# Patient Record
Sex: MALE | Race: OTHER
[De-identification: names, ages, dates, MRNs, and addresses within clinical notes are randomized per-mention and may not be internally consistent; named-entity substitution may affect disease eponyms.]

---

## 2018-03-21 ENCOUNTER — HOSPITAL ENCOUNTER (INPATIENT)
Dept: HOSPITAL 74 - YASAS | Age: 43
LOS: 3 days | Discharge: LEFT BEFORE DISCHARGE | DRG: 770 | End: 2018-03-24
Attending: INTERNAL MEDICINE | Admitting: INTERNAL MEDICINE
Payer: COMMERCIAL

## 2018-03-21 VITALS — BODY MASS INDEX: 31.6 KG/M2

## 2018-03-21 DIAGNOSIS — F19.24: ICD-10-CM

## 2018-03-21 DIAGNOSIS — Z87.09: ICD-10-CM

## 2018-03-21 DIAGNOSIS — F51.05: ICD-10-CM

## 2018-03-21 DIAGNOSIS — F32.9: ICD-10-CM

## 2018-03-21 DIAGNOSIS — G40.509: ICD-10-CM

## 2018-03-21 DIAGNOSIS — F10.230: Primary | ICD-10-CM

## 2018-03-21 DIAGNOSIS — F41.1: ICD-10-CM

## 2018-03-21 DIAGNOSIS — I10: ICD-10-CM

## 2018-03-21 DIAGNOSIS — F12.10: ICD-10-CM

## 2018-03-21 LAB
APPEARANCE UR: CLEAR
BILIRUB UR STRIP.AUTO-MCNC: NEGATIVE MG/DL
COLOR UR: (no result)
KETONES UR QL STRIP: (no result)
LEUKOCYTE ESTERASE UR QL STRIP.AUTO: NEGATIVE
NITRITE UR QL STRIP: NEGATIVE
PH UR: 6 [PH] (ref 5–8)
PROT UR QL STRIP: NEGATIVE
PROT UR QL STRIP: NEGATIVE
RBC # UR STRIP: NEGATIVE /UL
SP GR UR: 1 (ref 1–1.03)
UROBILINOGEN UR STRIP-MCNC: NEGATIVE MG/DL (ref 0.2–1)

## 2018-03-21 PROCEDURE — HZ2ZZZZ DETOXIFICATION SERVICES FOR SUBSTANCE ABUSE TREATMENT: ICD-10-PCS | Performed by: INTERNAL MEDICINE

## 2018-03-21 RX ADMIN — LEVETIRACETAM SCH MG: 500 TABLET, FILM COATED ORAL at 22:37

## 2018-03-21 RX ADMIN — Medication SCH MG: at 22:37
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
## 2018-03-22 LAB
ALBUMIN SERPL-MCNC: 3 G/DL (ref 3.4–5)
ALP SERPL-CCNC: 72 U/L (ref 45–117)
ALT SERPL-CCNC: 45 U/L (ref 12–78)
ANION GAP SERPL CALC-SCNC: 10 MMOL/L (ref 8–16)
AST SERPL-CCNC: 46 U/L (ref 15–37)
BILIRUB SERPL-MCNC: 2 MG/DL (ref 0.2–1)
BUN SERPL-MCNC: 7 MG/DL (ref 7–18)
CALCIUM SERPL-MCNC: 8.4 MG/DL (ref 8.5–10.1)
CHLORIDE SERPL-SCNC: 104 MMOL/L (ref 98–107)
CO2 SERPL-SCNC: 29 MMOL/L (ref 21–32)
CREAT SERPL-MCNC: 0.8 MG/DL (ref 0.7–1.3)
DEPRECATED RDW RBC AUTO: 14.9 % (ref 11.9–15.9)
GLUCOSE SERPL-MCNC: 85 MG/DL (ref 74–106)
HCT VFR BLD CALC: 36.9 % (ref 35.4–49)
HGB BLD-MCNC: 12.2 GM/DL (ref 11.7–16.9)
MCH RBC QN AUTO: 30.7 PG (ref 25.7–33.7)
MCHC RBC AUTO-ENTMCNC: 33.1 G/DL (ref 32–35.9)
MCV RBC: 92.7 FL (ref 80–96)
PLATELET # BLD AUTO: 198 K/MM3 (ref 134–434)
PMV BLD: 7.8 FL (ref 7.5–11.1)
POTASSIUM SERPLBLD-SCNC: 3.7 MMOL/L (ref 3.5–5.1)
PROT SERPL-MCNC: 6.4 G/DL (ref 6.4–8.2)
RBC # BLD AUTO: 3.98 M/MM3 (ref 4–5.6)
SODIUM SERPL-SCNC: 143 MMOL/L (ref 136–145)
WBC # BLD AUTO: 3.7 K/MM3 (ref 4–10)

## 2018-03-22 RX ADMIN — Medication SCH TAB: at 10:50

## 2018-03-22 RX ADMIN — Medication SCH MG: at 22:18

## 2018-03-22 RX ADMIN — LEVETIRACETAM SCH MG: 500 TABLET, FILM COATED ORAL at 10:50

## 2018-03-22 RX ADMIN — LEVETIRACETAM SCH MG: 500 TABLET, FILM COATED ORAL at 22:18

## 2018-03-22 RX ADMIN — Medication SCH MG: at 22:19

## 2018-03-22 RX ADMIN — METOPROLOL TARTRATE SCH MG: 25 TABLET, FILM COATED ORAL at 10:50

## 2018-03-22 NOTE — PN
S CIWA





- CIWA Score


Nausea/Vomitin-Mild Nausea/No Vomiting


Muscle Tremors: 4-Moderate,w/Arms Extend


Anxiety: 4-Mod. Anxious/Guarded


Agitation: 4-Moderately Restless


Paroxysmal Sweats: 2


Orientation: 1-Uncertain about Date


Tacttile Disturbances: 1-Very Mild Itch/Numbness


Auditory Disturbances: 0-None


Visual Disturbances: 0-None


Headache: 1-Very Mild


CIWA-Ar Total Score: 18





[facility] Progress Note (SOAP)


Subjective: 





sweat tremor mild headache anxiety restlessness unable to remember detail about 

yesterday


oriented to staff and time and space


patient is cooperative but anxious


Objective: 





18 09:19


 Vital Signs











Temperature  96.3 F L  18 06:33


 


Pulse Rate  86   18 06:33


 


Respiratory Rate  20   18 06:33


 


Blood Pressure  114/75   18 06:33


 


O2 Sat by Pulse Oximetry (%)      








 Laboratory Last Values











Urine Color  Straw   18  20:00    


 


Urine Appearance  Clear   18  20:00    


 


Urine pH  6.0  (5.0-8.0)   18  20:00    


 


Ur Specific Gravity  1.004  (1.001-1.035)   18  20:00    


 


Urine Protein  Negative  (NEGATIVE)   18  20:00    


 


Urine Glucose (UA)  Negative  (NEGATIVE)   18  20:00    


 


Urine Ketones  Trace  (NEGATIVE)  H  18  20:00    


 


Urine Blood  Negative  (NEGATIVE)   18  20:00    


 


Urine Nitrite  Negative  (NEGATIVE)   18  20:00    


 


Urine Bilirubin  Negative  (NEGATIVE)   18  20:00    


 


Urine Urobilinogen  Negative mg/dL (0.2-1.0)   18  20:00    


 


Ur Leukocyte Esterase  Negative  (NEGATIVE)   18  20:00    








lab noted


Assessment: 





18 09:20


withdrawal sx


Plan: 





continue detox

## 2018-03-22 NOTE — EKG
Test Reason : 

Blood Pressure : ***/*** mmHG

Vent. Rate : 081 BPM     Atrial Rate : 081 BPM

   P-R Int : 134 ms          QRS Dur : 100 ms

    QT Int : 418 ms       P-R-T Axes : 021 049 043 degrees

   QTc Int : 485 ms

 

NORMAL SINUS RHYTHM

PROLONGED QT

ABNORMAL ECG

WHEN COMPARED WITH ECG OF 21-MAR-2018 19:27,

T WAVE INVERSION NOW EVIDENT IN ANTERIOR LEADS

Confirmed by TOI SAMUELS MD ([id number]) on 3/22/2018 2:35:29 PM

 

Referred By:             Confirmed By:TOI SAMUELS MD

## 2018-03-22 NOTE — CONSULT
[facility] Psychiatric Consult





- Data


Date of interview: 03/22/18


Admission source: [facility]


Identifying data: This is 42 years old male , , living in shelter, [place] 

[place], with history of Alcohol and Nicotine dependence, is here for 

detoxification. Patientreports no history of psychiatric hospitalizations.


Substance Abuse History: Smoking history: Current every day smoker.  Have you 

smoked in the past 12 months: Yes.  Aproximately how many cigarettes per day: 

10.  Hx Chewing Tobacco Use: No.  Initiated information on smoking cessation: 

Yes.  'Breaking Loose' booklet given: 03/21/18.  - Substance & Tx. History.  Hx 

Alcohol Use: Yes.  Hx Substance Use: No.  Substance Use Type: Alcohol.  Hx 

Substance Use Treatment: Yes ([facility] 03/25/15 TO 03/29/15).  - Substances Abused.

  ** Alcohol.  Route: Oral.  Frequency: Daily.  Amount used: fifth of whiskey.  

Age of first use: 14.  Date of Last Use: 03/21/18


Medical History: HTN, Seizure history, History of Tracheostomy 2012.


Psychiatric History: Patient reports history of anxiety, deoression, insomnia, 

reoports taking prior to admission:  Ambien 10mg po qhs.  Denies suicidal 

history


Physical/Sexual Abuse/Trauma History: Denies


Additional Comment: Ambien 10mg po qhs





Mental Status Exam





- Mental Status Exam


Alert and Oriented to: Person


Cognitive Function: Fair


Patient Appearance: Well Groomed


Mood: Anxious


Affect: Mood Congruent


Patient Behavior: Cooperative


Speech Pattern: Appropriate


Voice Loudness: Normal


Thought Process: Goal Oriented


Thought Disorder: Being Controlled


Hallucinations: Denies


Suicidal Ideation: Denies


Homicidal Ideation: Denies


Insight/Judgement: Fair


Sleep: Difficulty falling asleep


Appetite: Fair


Muscle strength/Tone: Mild Hypotonicity


Gait/Station: Shuffling


Additional Comments: Ambien 10mg po qhs





Psychiatric Findings





- Problem List (Axis 1, 2,3)


(1) Alcohol dependence with uncomplicated intoxication


Current Visit: Yes   Status: Acute   





(2) Alcohol dependence with uncomplicated withdrawal


Current Visit: Yes   Status: Acute   





(3) Alcohol dependence


Current Visit: No   Status: Acute   





(4) JOVITA (generalized anxiety disorder)


Current Visit: No   Status: Acute   





(5) MDD (major depressive disorder)


Current Visit: No   Status: Suspected   





(6) Nicotine dependence


Current Visit: No   Status: Acute   





(7) Opioid dependence


Current Visit: No   Status: Acute   





(8) Seizures


Current Visit: No   Status: Acute   





(9) Drug-induced mood disorder


Current Visit: No   Status: Suspected   





- Initial Treatment Plan


Initial Treatment Plan: Ambien 10mg po qhs

## 2018-03-22 NOTE — PN
[facility] CIWA





- CIWA Score


Nausea/Vomitin-Mild Nausea/No Vomiting


Muscle Tremors: 4-Moderate,w/Arms Extend


Anxiety: 4-Mod. Anxious/Guarded


Agitation: 4-Moderately Restless


Paroxysmal Sweats: 1-Minimal Palms Moist


Orientation: 0-Oriented


Tacttile Disturbances: 0-None


Auditory Disturbances: 0-None


Visual Disturbances: 0-None


Headache: 0-None Present


CIWA-Ar Total Score: 14





[facility] Progress Note (SOAP)


Subjective: 





sweat 


tremor


anxiety


restlessness


mild gi distress


trouble sleeping


Objective: 





18 11:14


 Vital Signs











Temperature  97.7 F   18 10:00


 


Pulse Rate  96 H  18 10:00


 


Respiratory Rate  20   18 10:00


 


Blood Pressure  132/87   18 10:00


 


O2 Sat by Pulse Oximetry (%)      








 Laboratory Last Values











WBC  3.7 K/mm3 (4.0-10.0)  L D 18  07:30    


 


RBC  3.98 M/mm3 (4.00-5.60)  L  18  07:30    


 


Hgb  12.2 GM/dL (11.7-16.9)  D 18  07:30    


 


Hct  36.9 % (35.4-49)   18  07:30    


 


MCV  92.7 fl (80-96)   18  07:30    


 


MCH  30.7 pg (25.7-33.7)   18  07:30    


 


MCHC  33.1 g/dl (32.0-35.9)   18  07:30    


 


RDW  14.9 % (11.9-15.9)   18  07:30    


 


Plt Count  198 K/MM3 (134-434)  D 18  07:30    


 


MPV  7.8 fl (7.5-11.1)   18  07:30    


 


Sodium  143 mmol/L (136-145)   18  07:30    


 


Potassium  3.7 mmol/L (3.5-5.1)   18  07:30    


 


Chloride  104 mmol/L ()   18  07:30    


 


Carbon Dioxide  29 mmol/L (21-32)  D 18  07:30    


 


Anion Gap  10  (8-16)   18  07:30    


 


BUN  7 mg/dL (7-18)   18  07:30    


 


Creatinine  0.8 mg/dL (0.7-1.3)   18  07:30    


 


Creat Clearance w eGFR  > 60  (>60)   18  07:30    


 


Random Glucose  85 mg/dL ()   18  07:30    


 


Calcium  8.4 mg/dL (8.5-10.1)  L  18  07:30    


 


Total Bilirubin  2.0 mg/dL (0.2-1.0)  H D 18  07:30    


 


AST  46 U/L (15-37)  H D 18  07:30    


 


ALT  45 U/L (12-78)  D 18  07:30    


 


Alkaline Phosphatase  72 U/L ()   18  07:30    


 


Total Protein  6.4 g/dl (6.4-8.2)   18  07:30    


 


Albumin  3.0 g/dl (3.4-5.0)  L  18  07:30    


 


Urine Color  Straw   18  20:00    


 


Urine Appearance  Clear   18  20:00    


 


Urine pH  6.0  (5.0-8.0)   18  20:00    


 


Ur Specific Gravity  1.004  (1.001-1.035)   18  20:00    


 


Urine Protein  Negative  (NEGATIVE)   18  20:00    


 


Urine Glucose (UA)  Negative  (NEGATIVE)   18  20:00    


 


Urine Ketones  Trace  (NEGATIVE)  H  18  20:00    


 


Urine Blood  Negative  (NEGATIVE)   18  20:00    


 


Urine Nitrite  Negative  (NEGATIVE)   18  20:00    


 


Urine Bilirubin  Negative  (NEGATIVE)   18  20:00    


 


Urine Urobilinogen  Negative mg/dL (0.2-1.0)   18  20:00    


 


Ur Leukocyte Esterase  Negative  (NEGATIVE)   18  20:00    








lab noted


Assessment: 





18 11:15


withdrawal sx


Plan: 





continue detox

## 2018-03-23 RX ADMIN — METOPROLOL TARTRATE SCH MG: 25 TABLET, FILM COATED ORAL at 10:06

## 2018-03-23 RX ADMIN — Medication SCH APPLIC: at 22:06

## 2018-03-23 RX ADMIN — LEVETIRACETAM SCH MG: 500 TABLET, FILM COATED ORAL at 22:06

## 2018-03-23 RX ADMIN — Medication SCH TAB: at 10:06

## 2018-03-23 RX ADMIN — Medication SCH MG: at 22:05

## 2018-03-23 RX ADMIN — Medication SCH APPLIC: at 10:54

## 2018-03-23 RX ADMIN — Medication SCH MG: at 22:06

## 2018-03-23 RX ADMIN — LEVETIRACETAM SCH MG: 500 TABLET, FILM COATED ORAL at 10:06

## 2018-03-23 NOTE — PN
[facility] Progress Note (SOAP)


Subjective: 





interrupted sleep, sweats,shakes,need a/d oint for left facial scrap after sz. 

, not sleep. 


Objective: 





03/23/18 10:49


 Vital Signs











Temperature  96 F L  03/23/18 09:39


 


Pulse Rate  84   03/23/18 09:39


 


Respiratory Rate  18   03/23/18 09:39


 


Blood Pressure  133/90   03/23/18 09:39


 


O2 Sat by Pulse Oximetry (%)      








 Laboratory Tests











  03/21/18 03/22/18 03/22/18





  20:00 07:30 07:30


 


WBC   3.7 L D 


 


RBC   3.98 L 


 


Hgb   12.2  D 


 


Hct   36.9 


 


MCV   92.7 


 


MCH   30.7 


 


MCHC   33.1 


 


RDW   14.9 


 


Plt Count   198  D 


 


MPV   7.8 


 


Sodium    143


 


Potassium    3.7


 


Chloride    104


 


Carbon Dioxide    29  D


 


Anion Gap    10


 


BUN    7


 


Creatinine    0.8


 


Creat Clearance w eGFR    > 60


 


Random Glucose    85


 


Calcium    8.4 L


 


Total Bilirubin    2.0 H D


 


AST    46 H D


 


ALT    45  D


 


Alkaline Phosphatase    72


 


Total Protein    6.4


 


Albumin    3.0 L


 


Urine Color  Straw  


 


Urine Appearance  Clear  


 


Urine pH  6.0  


 


Ur Specific Gravity  1.004  


 


Urine Protein  Negative  


 


Urine Glucose (UA)  Negative  


 


Urine Ketones  Trace H  


 


Urine Blood  Negative  


 


Urine Nitrite  Negative  


 


Urine Bilirubin  Negative  


 


Urine Urobilinogen  Negative  


 


Ur Leukocyte Esterase  Negative  


 


RPR Titer   














  03/22/18





  07:30


 


WBC 


 


RBC 


 


Hgb 


 


Hct 


 


MCV 


 


MCH 


 


MCHC 


 


RDW 


 


Plt Count 


 


MPV 


 


Sodium 


 


Potassium 


 


Chloride 


 


Carbon Dioxide 


 


Anion Gap 


 


BUN 


 


Creatinine 


 


Creat Clearance w eGFR 


 


Random Glucose 


 


Calcium 


 


Total Bilirubin 


 


AST 


 


ALT 


 


Alkaline Phosphatase 


 


Total Protein 


 


Albumin 


 


Urine Color 


 


Urine Appearance 


 


Urine pH 


 


Ur Specific Gravity 


 


Urine Protein 


 


Urine Glucose (UA) 


 


Urine Ketones 


 


Urine Blood 


 


Urine Nitrite 


 


Urine Bilirubin 


 


Urine Urobilinogen 


 


Ur Leukocyte Esterase 


 


RPR Titer  Nonreactive








pt aox3 in nad ambulating  


left face mild excoriation, 


Assessment: 





03/23/18 10:50


withdrawal sx's 


left facial excoriation 


insomnia 











cont. detox 


increase fluids 


a/d oint bid 


pysch eval for sleep meds

## 2018-03-23 NOTE — PN
Psychiatric Progress Note


Vital Signs: 


 Vital Signs











 Period  Temp  Pulse  Resp  BP Sys/Ayala  Pulse Ox


 


 Last 24 Hr  96 F-98.2 F  80-90  18-18  123-145/77-90  











Date of Session: 03/23/18


Chief Complaint:: "I can't sleep."


HPI: Pt. admitted to  for alcohol and nicotine dependence.


ROS: Unremarkable


Current Medications: 


Active Medications











Generic Name Dose Route Start Last Admin





  Trade Name Freq  PRN Reason Stop Dose Admin


 


Acetaminophen  650 mg  03/21/18 18:02  





  Tylenol -  PO   





  Q4H PRN   





  FEVER   


 


Al Hydroxide/Mg Hydroxide  30 ml  03/21/18 18:02  





  Mylanta Oral Suspension -  PO   





  Q6H PRN   





  DYSPEPSIA   


 


Chlordiazepoxide HCl  25 mg  03/22/18 23:00  03/23/18 10:06





  Librium -  PO  03/23/18 17:01  25 mg





  [id number] RISHABH   Administration


 


Chlordiazepoxide HCl  15 mg  03/23/18 23:00  





  Librium -  PO  03/24/18 17:01  





  [id number] RISHABH   


 


Chlordiazepoxide HCl  25 mg  03/21/18 18:02  





  Librium -  PO  03/24/18 18:01  





  Q4H PRN   





  WITHDRAWAL(CONT SUBST)   


 


Chlordiazepoxide HCl  10 mg  03/24/18 23:00  





  Librium -  PO  03/25/18 17:01  





  [id number] RISHABH   


 


Clonidine  0.2 mg  03/21/18 22:00  03/23/18 10:06





  Catapres -  PO   0.2 mg





  BID RISHABH   Administration


 


Eucalyptus/Menthol/Phenol/Sorbitol  1 each  03/21/18 18:02  





  Cepastat Lozenge -  MM   





  Q4H PRN   





  SORE THROAT   


 


Guaifenesin  10 ml  03/21/18 18:02  





  Robitussin Dm -  PO   





  Q6H PRN   





  COUGH   


 


Hydroxyzine Pamoate  50 mg  03/21/18 18:02  





  Vistaril -  PO   





  Q4H PRN   





  AGITATION   


 


Ibuprofen  400 mg  03/21/18 18:02  





  Motrin -  PO   





  Q6H PRN   





  PAIN LEVEL 4-6   


 


Levetiracetam  500 mg  03/21/18 22:00  03/23/18 10:06





  Keppra -  PO   500 mg





  BID RISHABH   Administration


 


Loperamide HCl  4 mg  03/21/18 18:02  





  Imodium -  PO   





  Q6H PRN   





  DIARRHEA   


 


Magnesium Citrate  300 ml  03/21/18 18:02  





  Citroma -  PO   





  Q48H PRN   





  CONSTIPATION   


 


Magnesium Hydroxide  30 ml  03/21/18 18:02  





  Milk Of Magnesia -  PO   





  DAILY PRN   





  CONSTIPATION   


 


Melatonin  5 mg  03/21/18 22:00  03/22/18 22:18





  Melatonin  PO   5 mg





  HS RISHABH   Administration


 


Metoprolol Tartrate  25 mg  03/22/18 10:00  03/23/18 10:06





  Lopressor -  PO   25 mg





  DAILY RISHABH   Administration


 


Prenatal Multivit/Folic Acid/Iron  1 tab  03/22/18 10:00  03/23/18 10:06





  Prenatal Vitamins (Sjr) -  PO   1 tab





  DAILY RISHABH   Administration


 


Pseudoephedrine/Triprolidine  1 combo  03/21/18 18:02  





  Actifed -  PO   





  TID PRN   





  NASAL CONGESTION   


 


Thiamine HCl  100 mg  03/21/18 22:00  03/22/18 22:19





  Vitamin B1 -  PO   100 mg





  HS RISHBAH   Administration


 


Vitamin A/Vitamin D  1 applic  03/23/18 10:00  03/23/18 10:54





  Vitamin A & D Top Oint -  TP   1 applic





  BID RISHABH   Administration


 


Zolpidem Tartrate  10 mg  03/22/18 22:00  





  Ambien -  PO  03/25/18 21:59  





  HS PRN   





  INSOMNIA   











Medication(s) Change(s): No.


Current Side Effect: No


Lab tests ordered: No


Lab tests reviewed: Yes


Provider note:: Writer met with patient concerning psychiatric reconsultation. 

Pt. c/o insomnia. Patient made aware that ambien was ordered by Dr. Santana on 

3/22/18. Pt. did not receive ambien 10mg last night. Pt. encouraged to take the 

ambien tonight. Nursing staff informed. Pt. educated on the benefits and side 

effects of ambien. Pt. made aware of the risk of parasomnia when accepting 

ambien. Will continue to monitor.


Total face to face time:: 15





Mental Status Exam





- Mental Status Exam


Alert and Oriented to: Time, Place, Person


Cognitive Function: Good


Patient Appearance: Well Groomed


Mood: Euthymic


Affect: Mood Congruent


Patient Behavior: Cooperative


Speech Pattern: Appropriate


Voice Loudness: Normal


Thought Process: Goal Oriented


Thought Disorder: Not Present


Hallucinations: Denies


Suicidal Ideation: Denies


Homicidal Ideation: Denies


Insight/Judgement: Poor


Sleep: Poorly


Appetite: Fair


Muscle strength/Tone: Normal


Gait/Station: Normal





Psychiatric Treatment Plan





- Problem List


(1) Alcohol dependence with uncomplicated withdrawal


Current Visit: Yes   





(2) Alcohol dependence


Current Visit: Yes   





(3) JOVITA (generalized anxiety disorder)


Current Visit: No   





(4) Insomnia


Current Visit: Yes   





(5) Drug-induced mood disorder


Current Visit: No

## 2018-03-24 VITALS — SYSTOLIC BLOOD PRESSURE: 120 MMHG | HEART RATE: 98 BPM | DIASTOLIC BLOOD PRESSURE: 84 MMHG | TEMPERATURE: 97.6 F

## 2018-03-24 RX ADMIN — LEVETIRACETAM SCH MG: 500 TABLET, FILM COATED ORAL at 09:11

## 2018-03-24 RX ADMIN — METOPROLOL TARTRATE SCH MG: 25 TABLET, FILM COATED ORAL at 09:11

## 2018-03-24 RX ADMIN — Medication SCH APPLIC: at 10:19

## 2018-03-24 RX ADMIN — Medication SCH TAB: at 10:18

## 2018-03-24 NOTE — PN
[facility] Progress Note (SOAP)


Subjective: 





sleep disturbance


shakes


Objective: 





03/24/18 16:54


A & O x 3,


noted to be ambulating steadily on unit


anxious


 Vital Signs











Temperature  97.6 F   03/24/18 09:51


 


Pulse Rate  98 H  03/24/18 09:51


 


Respiratory Rate  16   03/24/18 09:51


 


Blood Pressure  120/84   03/24/18 09:51


 


O2 Sat by Pulse Oximetry (%)      














Assessment: 





03/24/18 16:54


withdrawal sx








Plan: 





continue detox

## 2018-03-24 NOTE — DS
[facility] Detox Discharge Summary


Admission Date: 


03/21/18





Discharge Date: 03/24/18





- History


Additional Comments: 





After A.M rounds, pt requested to leave.


Gave no reasons, just that he wants to leave


Insists on leaving despite advice to stay,


in no acute distress, denies SI/HI


pt will leave AMA


Pertinent Past History: 





seizures


HTN


insomnia





- Physical Exam Results


Vital Signs: 


 Vital Signs











Temperature  97.6 F   03/24/18 09:51


 


Pulse Rate  98 H  03/24/18 09:51


 


Respiratory Rate  16   03/24/18 09:51


 


Blood Pressure  120/84   03/24/18 09:51


 


O2 Sat by Pulse Oximetry (%)      











Pertinent Admission Physical Exam Findings: 





withdrawal sx





- Medication


Discharge Medications: 


Ambulatory Orders





Clonidine HCl 0.2 mg PO BID 03/21/18 


Zolpidem Tartrate [Ambien] 10 mg PO HS 03/21/18 


levETIRAcetam [Keppra -] 500 mg PO BID 03/21/18 


Metoprolol Tartrate [Lopressor -] 25 mg PO DAILY #30 tablet 03/23/18 


Metoprolol Tartrate [Lopressor -] 25 mg PO DAILY #30 tablet 03/23/18 


Vitamin A & D Top Oint - 1 applic TP BID #1 tube 03/23/18 


levETIRAcetam [Keppra -] 500 mg PO BID #60 tablet 03/23/18 











- AMA


Did Patient Leave Against Medical Advice: Yes

## 2018-03-26 NOTE — EKG
Test Reason : 

Blood Pressure : ***/*** mmHG

Vent. Rate : 113 BPM     Atrial Rate : 113 BPM

   P-R Int : 134 ms          QRS Dur : 090 ms

    QT Int : 350 ms       P-R-T Axes : [phone] degrees

   QTc Int : 480 ms

 

SINUS TACHYCARDIA

MINIMAL VOLTAGE CRITERIA FOR LVH, MAY BE NORMAL VARIANT

NONSPECIFIC ST ABNORMALITY

ABNORMAL ECG

NO PREVIOUS ECGS AVAILABLE

Confirmed by HOLLY CASTILLO MD ([id number]) on 3/26/2018 9:17:23 AM

 

Referred By:             Confirmed By:HOLLY CASTILLO MD

## 2018-04-28 ENCOUNTER — HOSPITAL ENCOUNTER (INPATIENT)
Dept: HOSPITAL 74 - YASAS | Age: 43
LOS: 4 days | Discharge: OTHER INSTITUTION | End: 2018-05-02
Attending: INTERNAL MEDICINE | Admitting: INTERNAL MEDICINE
Payer: COMMERCIAL

## 2018-04-28 VITALS — BODY MASS INDEX: 30.7 KG/M2

## 2018-04-28 DIAGNOSIS — F10.230: Primary | ICD-10-CM

## 2018-04-28 DIAGNOSIS — G89.29: ICD-10-CM

## 2018-04-28 DIAGNOSIS — G40.909: ICD-10-CM
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
## 2018-04-28 DIAGNOSIS — F17.213: ICD-10-CM

## 2018-04-28 DIAGNOSIS — Z91.010: ICD-10-CM

## 2018-04-28 DIAGNOSIS — F41.1: ICD-10-CM

## 2018-04-28 DIAGNOSIS — F51.05: ICD-10-CM

## 2018-04-28 DIAGNOSIS — F16.20: ICD-10-CM

## 2018-04-28 DIAGNOSIS — F19.24: ICD-10-CM

## 2018-04-28 DIAGNOSIS — S01.81XD: ICD-10-CM

## 2018-04-28 DIAGNOSIS — Z91.018: ICD-10-CM

## 2018-04-28 DIAGNOSIS — Z87.11: ICD-10-CM

## 2018-04-28 DIAGNOSIS — I10: ICD-10-CM

## 2018-04-28 DIAGNOSIS — G47.00: ICD-10-CM

## 2018-04-28 DIAGNOSIS — F19.280: ICD-10-CM

## 2018-04-28 PROCEDURE — HZ2ZZZZ DETOXIFICATION SERVICES FOR SUBSTANCE ABUSE TREATMENT: ICD-10-PCS | Performed by: SURGERY

## 2018-04-28 RX ADMIN — Medication SCH MG: at 22:29

## 2018-04-28 RX ADMIN — LEVETIRACETAM SCH MG: 500 TABLET, FILM COATED ORAL at 22:29
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
## 2018-04-29 LAB
ALBUMIN SERPL-MCNC: 2.8 G/DL (ref 3.4–5)
ALP SERPL-CCNC: 61 U/L (ref 45–117)
ALT SERPL-CCNC: 45 U/L (ref 12–78)
ANION GAP SERPL CALC-SCNC: 5 MMOL/L (ref 8–16)
APPEARANCE UR: CLEAR
AST SERPL-CCNC: 52 U/L (ref 15–37)
BILIRUB SERPL-MCNC: 0.5 MG/DL (ref 0.2–1)
BILIRUB UR STRIP.AUTO-MCNC: NEGATIVE MG/DL
BUN SERPL-MCNC: 7 MG/DL (ref 7–18)
CALCIUM SERPL-MCNC: 8 MG/DL (ref 8.5–10.1)
CHLORIDE SERPL-SCNC: 106 MMOL/L (ref 98–107)
CO2 SERPL-SCNC: 29 MMOL/L (ref 21–32)
COLOR UR: (no result)
CREAT SERPL-MCNC: 0.8 MG/DL (ref 0.7–1.3)
DEPRECATED RDW RBC AUTO: 15.9 % (ref 11.9–15.9)
GLUCOSE SERPL-MCNC: 89 MG/DL (ref 74–106)
HCT VFR BLD CALC: 30.3 % (ref 35.4–49)
HGB BLD-MCNC: 10.3 GM/DL (ref 11.7–16.9)
KETONES UR QL STRIP: NEGATIVE
LEUKOCYTE ESTERASE UR QL STRIP.AUTO: NEGATIVE
MCH RBC QN AUTO: 31.5 PG (ref 25.7–33.7)
MCHC RBC AUTO-ENTMCNC: 34.1 G/DL (ref 32–35.9)
MCV RBC: 92.5 FL (ref 80–96)
NITRITE UR QL STRIP: NEGATIVE
PH UR: 6 [PH] (ref 5–8)
PLATELET # BLD AUTO: 227 K/MM3 (ref 134–434)
PMV BLD: 8 FL (ref 7.5–11.1)
POTASSIUM SERPLBLD-SCNC: 3.7 MMOL/L (ref 3.5–5.1)
PROT SERPL-MCNC: 6.1 G/DL (ref 6.4–8.2)
PROT UR QL STRIP: NEGATIVE
PROT UR QL STRIP: NEGATIVE
RBC # BLD AUTO: 3.28 M/MM3 (ref 4–5.6)
RBC # UR STRIP: NEGATIVE /UL
SODIUM SERPL-SCNC: 140 MMOL/L (ref 136–145)
SP GR UR: 1 (ref 1–1.03)
UROBILINOGEN UR STRIP-MCNC: NEGATIVE MG/DL (ref 0.2–1)
WBC # BLD AUTO: 4.6 K/MM3 (ref 4–10)

## 2018-04-29 RX ADMIN — METOPROLOL TARTRATE SCH MG: 25 TABLET, FILM COATED ORAL at 10:16

## 2018-04-29 RX ADMIN — BACITRACIN ZINC SCH APPLIC: 500 OINTMENT TOPICAL at 11:38

## 2018-04-29 RX ADMIN — LEVETIRACETAM SCH MG: 500 TABLET, FILM COATED ORAL at 22:20

## 2018-04-29 RX ADMIN — LOPERAMIDE HYDROCHLORIDE PRN MG: 2 CAPSULE ORAL at 21:46

## 2018-04-29 RX ADMIN — Medication SCH MG: at 22:20

## 2018-04-29 RX ADMIN — BACITRACIN ZINC SCH GM: 500 OINTMENT TOPICAL at 22:28

## 2018-04-29 RX ADMIN — ZOLPIDEM TARTRATE PRN MG: 5 TABLET, COATED ORAL at 22:28

## 2018-04-29 RX ADMIN — LEVETIRACETAM SCH MG: 500 TABLET, FILM COATED ORAL at 10:15

## 2018-04-29 RX ADMIN — BACITRACIN ZINC SCH: 500 OINTMENT TOPICAL at 23:03

## 2018-04-29 RX ADMIN — Medication SCH TAB: at 10:16
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
## 2018-04-29 NOTE — EKG
Test Reason : 

Blood Pressure : ***/*** mmHG

Vent. Rate : 115 BPM     Atrial Rate : 115 BPM

   P-R Int : 132 ms          QRS Dur : 100 ms

    QT Int : 340 ms       P-R-T Axes : 046 050 012 degrees

   QTc Int : 470 ms

 

SINUS TACHYCARDIA

POSSIBLE LEFT ATRIAL ENLARGEMENT

NONSPECIFIC ST AND T WAVE ABNORMALITY

ABNORMAL ECG

WHEN COMPARED WITH ECG OF 22-MAR-2018 08:34,

T WAVE INVERSION NOW EVIDENT IN INFERIOR LEADS

T WAVE INVERSION NO LONGER EVIDENT IN ANTERIOR LEADS

Confirmed by ARVIND NAJERA, TOI ([id number]) on 4/29/2018 10:46:48 AM

 

Referred By:             Confirmed By:TOI SAMUELS MD

## 2018-04-30 RX ADMIN — LOPERAMIDE HYDROCHLORIDE PRN MG: 2 CAPSULE ORAL at 06:01

## 2018-04-30 RX ADMIN — Medication SCH TAB: at 10:16

## 2018-04-30 RX ADMIN — LEVETIRACETAM SCH MG: 500 TABLET, FILM COATED ORAL at 10:16

## 2018-04-30 RX ADMIN — METOPROLOL TARTRATE SCH MG: 25 TABLET, FILM COATED ORAL at 10:16

## 2018-04-30 RX ADMIN — BACITRACIN ZINC SCH GM: 500 OINTMENT TOPICAL at 22:17

## 2018-04-30 RX ADMIN — Medication SCH MG: at 22:17

## 2018-04-30 RX ADMIN — LEVETIRACETAM SCH MG: 500 TABLET, FILM COATED ORAL at 22:17

## 2018-04-30 RX ADMIN — BACITRACIN ZINC SCH GM: 500 OINTMENT TOPICAL at 10:15

## 2018-04-30 RX ADMIN — LOPERAMIDE HYDROCHLORIDE PRN MG: 2 CAPSULE ORAL at 22:19

## 2018-04-30 RX ADMIN — ZOLPIDEM TARTRATE PRN MG: 5 TABLET, COATED ORAL at 22:18

## 2018-04-30 NOTE — PN
[facility] CIWA





- CIWA Score


Nausea/Vomitin-No Nausea/No Vomiting


Muscle Tremors: 4-Moderate,w/Arms Extend


Anxiety: 4-Mod. Anxious/Guarded


Agitation: 4-Moderately Restless


Paroxysmal Sweats: 1-Minimal Palms Moist


Orientation: 0-Oriented


Tacttile Disturbances: 0-None


Auditory Disturbances: 0-None


Visual Disturbances: 0-None


Headache: 0-None Present


CIWA-Ar Total Score: 13





BHS Progress Note (SOAP)


Subjective: 





ANXIETY,SWEATS,TREMORS,DIARRHEA.


Objective: 





18 11:29


 Vital Signs











Temperature  97.8 F   18 09:11


 


Pulse Rate  89   18 09:11


 


Respiratory Rate  18   18 09:11


 


Blood Pressure  120/73   18 09:11


 


O2 Sat by Pulse Oximetry (%)      








 Laboratory Last Values











WBC  4.6 K/mm3 (4.0-10.0)   18  08:00    


 


RBC  3.28 M/mm3 (4.00-5.60)  L  18  08:00    


 


Hgb  10.3 GM/dL (11.7-16.9)  L D 18  08:00    


 


Hct  30.3 % (35.4-49)  L D 18  08:00    


 


MCV  92.5 fl (80-96)   18  08:00    


 


MCH  31.5 pg (25.7-33.7)   18  08:00    


 


MCHC  34.1 g/dl (32.0-35.9)   18  08:00    


 


RDW  15.9 % (11.9-15.9)   18  08:00    


 


Plt Count  227 K/MM3 (134-434)   18  08:00    


 


MPV  8.0 fl (7.5-11.1)   18  08:00    


 


Sodium  140 mmol/L (136-145)   18  08:00    


 


Potassium  3.7 mmol/L (3.5-5.1)   18  08:00    


 


Chloride  106 mmol/L ()   18  08:00    


 


Carbon Dioxide  29 mmol/L (21-32)   18  08:00    


 


Anion Gap  5  (8-16)  L  18  08:00    


 


BUN  7 mg/dL (7-18)   18  08:00    


 


Creatinine  0.8 mg/dL (0.7-1.3)   18  08:00    


 


Creat Clearance w eGFR  > 60  (>60)   18  08:00    


 


Random Glucose  89 mg/dL ()   18  08:00    


 


Calcium  8.0 mg/dL (8.5-10.1)  L  18  08:00    


 


Total Bilirubin  0.5 mg/dL (0.2-1.0)  D 18  08:00    


 


AST  52 U/L (15-37)  H  18  08:00    


 


ALT  45 U/L (12-78)   18  08:00    


 


Alkaline Phosphatase  61 U/L ()   18  08:00    


 


Total Protein  6.1 g/dl (6.4-8.2)  L  18  08:00    


 


Albumin  2.8 g/dl (3.4-5.0)  L  18  08:00    


 


Urine Color  Straw   18  08:51    


 


Urine Appearance  Clear   18  08:51    


 


Urine pH  6.0  (5.0-8.0)   18  08:51    


 


Ur Specific Gravity  1.004  (1.001-1.035)   18  08:51    


 


Urine Protein  Negative  (NEGATIVE)   18  08:51    


 


Urine Glucose (UA)  Negative  (NEGATIVE)   18  08:51    


 


Urine Ketones  Negative  (NEGATIVE)   18  08:51    


 


Urine Blood  Negative  (NEGATIVE)   18  08:51    


 


Urine Nitrite  Negative  (NEGATIVE)   18  08:51    


 


Urine Bilirubin  Negative  (<2.0 mg/dL)   18  08:51    


 


Urine Urobilinogen  Negative mg/dL (0.2-1.0)   18  08:51    


 


Ur Leukocyte Esterase  Negative  (NEGATIVE)   18  08:51    


 


RPR Titer  Nonreactive  (NONREACTIVE)   18  08:00    











Assessment: 





18 11:29


WITHDRAWAL SX


Plan: 





CONTINUE DETOX


IMODIUM PRN. LOMOTIL IF IMODIUM NOT EFFECTIVE.

## 2018-05-01 RX ADMIN — BACITRACIN ZINC SCH GM: 500 OINTMENT TOPICAL at 10:13

## 2018-05-01 RX ADMIN — ZOLPIDEM TARTRATE PRN MG: 5 TABLET, COATED ORAL at 22:16

## 2018-05-01 RX ADMIN — METOPROLOL TARTRATE SCH MG: 25 TABLET, FILM COATED ORAL at 10:13

## 2018-05-01 RX ADMIN — HYDROXYZINE PAMOATE PRN MG: 50 CAPSULE ORAL at 15:22

## 2018-05-01 RX ADMIN — LEVETIRACETAM SCH MG: 500 TABLET, FILM COATED ORAL at 10:13

## 2018-05-01 RX ADMIN — Medication SCH TAB: at 10:13

## 2018-05-01 RX ADMIN — HYDROXYZINE PAMOATE PRN MG: 50 CAPSULE ORAL at 19:39

## 2018-05-01 RX ADMIN — BACITRACIN ZINC SCH GM: 500 OINTMENT TOPICAL at 22:15

## 2018-05-01 RX ADMIN — Medication SCH MG: at 22:15

## 2018-05-01 RX ADMIN — LEVETIRACETAM SCH MG: 500 TABLET, FILM COATED ORAL at 22:15

## 2018-05-01 NOTE — PN
Psychiatric Progress Note


Vital Signs: 


 Vital Signs











 Period  Temp  Pulse  Resp  BP Sys/Ayala  Pulse Ox


 


 Last 24 Hr  95.5 F-98.4 F    18-18  111-128/77-86  











Date of Session: 05/01/18


Chief Complaint:: " I can't sleep at night and I feel anxious.They stop my 

klonopin."


HPI: Patient is about to complete detoxification for alcohol and phencyclidine 

dependence.Benign hospital course.Mr Payne is complaining of anxiety and 

inquiring about anxiolytic medications (benzodiazepines).


ROS: Unremarkable.


Current Medications: 


Active Medications











Generic Name Dose Route Start Last Admin





  Trade Name Freq  PRN Reason Stop Dose Admin


 


Acetaminophen  650 mg  04/28/18 17:04  





  Tylenol -  PO   





  Q4H PRN   





  FEVER   


 


Al Hydroxide/Mg Hydroxide  30 ml  04/28/18 17:04  





  Mylanta Oral Suspension -  PO   





  Q6H PRN   





  DYSPEPSIA   


 


Bacitracin  0.9 gm  04/29/18 22:30  05/01/18 10:13





  Bacitracin -  TP   0.9 gm





  BID RISHABH   Administration


 


Buspirone HCl  15 mg  04/29/18 14:00  05/01/18 14:12





  Buspar -  PO   15 mg





  TID RISHABH   Administration


 


Chlordiazepoxide HCl  15 mg  04/30/18 23:00  05/01/18 10:13





  Librium -  PO  05/01/18 17:01  15 mg





  E2N-GSS RISHABH   Administration


 


Chlordiazepoxide HCl  25 mg  04/28/18 17:04  05/01/18 01:38





  Librium -  PO  05/01/18 17:03  25 mg





  Q4H PRN   Administration





  WITHDRAWAL(CONT SUBST)   


 


Chlordiazepoxide HCl  10 mg  05/01/18 23:00  





  Librium -  PO  05/02/18 17:01  





  [id number] RISHABH   


 


Clonidine  0.2 mg  04/28/18 22:00  05/01/18 10:13





  Catapres -  PO   0.2 mg





  BID RISHABH   Administration


 


Eucalyptus/Menthol/Phenol/Sorbitol  1 each  04/28/18 17:04  





  Cepastat Lozenge -  MM   





  Q4H PRN   





  SORE THROAT   


 


Guaifenesin  10 ml  04/28/18 17:04  





  Robitussin Dm -  PO   





  Q6H PRN   





  COUGH   


 


Hydroxyzine Pamoate  50 mg  04/28/18 17:04  05/01/18 15:22





  Vistaril -  PO   50 mg





  Q4H PRN   Administration





  AGITATION   


 


Ibuprofen  400 mg  04/28/18 17:04  





  Motrin -  PO   





  Q6H PRN   





  PAIN LEVEL 4-6   


 


Levetiracetam  500 mg  04/28/18 22:00  05/01/18 10:13





  Keppra -  PO   500 mg





  BID RISHABH   Administration


 


Loperamide HCl  4 mg  04/28/18 17:04  04/30/18 22:19





  Imodium -  PO   4 mg





  Q6H PRN   Administration





  DIARRHEA   


 


Magnesium Citrate  300 ml  04/28/18 17:04  





  Citroma -  PO   





  Q48H PRN   





  CONSTIPATION   


 


Magnesium Hydroxide  30 ml  04/28/18 17:04  





  Milk Of Magnesia -  PO   





  DAILY PRN   





  CONSTIPATION   


 


Melatonin  5 mg  04/28/18 22:00  04/28/18 22:32





  Melatonin  PO   5 mg





  HS PRN   Administration





  INSOMNIA   


 


Metoprolol Tartrate  25 mg  04/29/18 10:00  05/01/18 10:13





  Lopressor -  PO   25 mg





  DAILY RISHABH   Administration


 


Prenatal Multivit/Folic Acid/Iron  1 tab  04/29/18 10:00  05/01/18 10:13





  Prenatal Vitamins (Sjr) -  PO   1 tab





  DAILY RISHABH   Administration


 


Pseudoephedrine/Triprolidine  1 combo  04/28/18 17:04  





  Actifed -  PO   





  TID PRN   





  NASAL CONGESTION   


 


Thiamine HCl  100 mg  04/28/18 22:00  04/30/18 22:17





  Vitamin B1 -  PO   100 mg





  HS RISHABH   Administration


 


Trazodone HCl  100 mg  05/01/18 22:00  





  Desyrel -  PO   





  HS RISHABH   


 


Zolpidem Tartrate  10 mg  04/29/18 08:48  04/30/18 22:18





  Ambien -  PO   10 mg





  HS PRN   Administration





  INSOMNIA   











Medication(s) Change(s): Trazodone 100 mg po hs.Ordered at patient's request.Mr Payne indicates that he used to be on 200 mg of trazodone in the past.With 

favorable response + without adverse effects.Risk of priapism is dicussed with 

the patient.Agrees with this careplan.


Current Side Effect: No


Lab tests ordered: No


Lab tests reviewed: Yes


Provider note:: Chart reviewed.Dr coronado's note of 4/29/18 : 

appreciated.Patient is interviewed.Mr Payne appears well groomed,in good 

physical health and mildly apprehensive." I know they cannot give me klonopin 

but I need something to calm my nerves." Patient feels concerned about 

relapsing soon after his discharge scheduled for tomorrow." The shelter is full 

of guys using drugs and this makes me feel like going back to selling and using.

" Patient has always been visible on the unit.Observed socializing with 

peers.Eats well.Complains that ambien is " not working." Sleep hygiene 

discussed. Patient is receptive to teaching.Mood remains stable.No complaint of 

suicidal/homicidal ideation,intent or plan.Baseline mental status.


Total face to face time:: 25





Mental Status Exam





- Mental Status Exam


Alert and Oriented to: Time, Place, Person


Cognitive Function: Good


Patient Appearance: Well Groomed


Mood: Apprehensive (mildly anxious), Hopeful


Affect: Appropriate, Normal Range


Patient Behavior: Appropriate, Cooperative


Speech Pattern: Clear


Voice Loudness: Normal


Thought Process: Intact, Goal Oriented


Thought Disorder: Not Present


Hallucinations: Denies


Suicidal Ideation: Denies


Homicidal Ideation: Denies


Insight/Judgement: Fair


Sleep: Difficulty falling asleep


Appetite: Good


Muscle strength/Tone: Normal


Gait/Station: Normal





Psychiatric Treatment Plan





- Problem List


(1) Alcohol dependence with uncomplicated withdrawal


Current Visit: Yes   





(2) Phencyclidine dependence


Current Visit: Yes   





(3) Nicotine dependence


Current Visit: Yes   


Qualifiers: 


   Nicotine product type: cigarettes   Substance use status: in withdrawal   

Qualified Code(s): F17.213 - Nicotine dependence, cigarettes, with withdrawal   





(4) JOVITA (generalized anxiety disorder)


Current Visit: Yes   





(5) Insomnia


Current Visit: Yes   





(6) Drug-induced mood disorder


Current Visit: Yes   





(7) Substance-induced anxiety disorder


Current Visit: Yes

## 2018-05-01 NOTE — PN
[facility] Progress Note (SOAP)


Subjective: 





ANXIETY,FATIGUE,SLIGHT TREMORS,SWEATS.


Objective: 





05/01/18 10:20


 Laboratory Last Values











WBC  4.6 K/mm3 (4.0-10.0)   04/29/18  08:00    


 


RBC  3.28 M/mm3 (4.00-5.60)  L  04/29/18  08:00    


 


Hgb  10.3 GM/dL (11.7-16.9)  L D 04/29/18  08:00    


 


Hct  30.3 % (35.4-49)  L D 04/29/18  08:00    


 


MCV  92.5 fl (80-96)   04/29/18  08:00    


 


MCH  31.5 pg (25.7-33.7)   04/29/18  08:00    


 


MCHC  34.1 g/dl (32.0-35.9)   04/29/18  08:00    


 


RDW  15.9 % (11.9-15.9)   04/29/18  08:00    


 


Plt Count  227 K/MM3 (134-434)   04/29/18  08:00    


 


MPV  8.0 fl (7.5-11.1)   04/29/18  08:00    


 


Sodium  140 mmol/L (136-145)   04/29/18  08:00    


 


Potassium  3.7 mmol/L (3.5-5.1)   04/29/18  08:00    


 


Chloride  106 mmol/L ()   04/29/18  08:00    


 


Carbon Dioxide  29 mmol/L (21-32)   04/29/18  08:00    


 


Anion Gap  5  (8-16)  L  04/29/18  08:00    


 


BUN  7 mg/dL (7-18)   04/29/18  08:00    


 


Creatinine  0.8 mg/dL (0.7-1.3)   04/29/18  08:00    


 


Creat Clearance w eGFR  > 60  (>60)   04/29/18  08:00    


 


Random Glucose  89 mg/dL ()   04/29/18  08:00    


 


Calcium  8.0 mg/dL (8.5-10.1)  L  04/29/18  08:00    


 


Total Bilirubin  0.5 mg/dL (0.2-1.0)  D 04/29/18  08:00    


 


AST  52 U/L (15-37)  H  04/29/18  08:00    


 


ALT  45 U/L (12-78)   04/29/18  08:00    


 


Alkaline Phosphatase  61 U/L ()   04/29/18  08:00    


 


Total Protein  6.1 g/dl (6.4-8.2)  L  04/29/18  08:00    


 


Albumin  2.8 g/dl (3.4-5.0)  L  04/29/18  08:00    


 


Urine Color  Straw   04/29/18  08:51    


 


Urine Appearance  Clear   04/29/18  08:51    


 


Urine pH  6.0  (5.0-8.0)   04/29/18  08:51    


 


Ur Specific Gravity  1.004  (1.001-1.035)   04/29/18  08:51    


 


Urine Protein  Negative  (NEGATIVE)   04/29/18  08:51    


 


Urine Glucose (UA)  Negative  (NEGATIVE)   04/29/18  08:51    


 


Urine Ketones  Negative  (NEGATIVE)   04/29/18  08:51    


 


Urine Blood  Negative  (NEGATIVE)   04/29/18  08:51    


 


Urine Nitrite  Negative  (NEGATIVE)   04/29/18  08:51    


 


Urine Bilirubin  Negative  (<2.0 mg/dL)   04/29/18  08:51    


 


Urine Urobilinogen  Negative mg/dL (0.2-1.0)   04/29/18  08:51    


 


Ur Leukocyte Esterase  Negative  (NEGATIVE)   04/29/18  08:51    


 


RPR Titer  Nonreactive  (NONREACTIVE)   04/29/18  08:00    








 Vital Signs











Temperature  95.5 F L  05/01/18 09:14


 


Pulse Rate  93 H  05/01/18 09:14


 


Respiratory Rate  18   05/01/18 09:14


 


Blood Pressure  116/77   05/01/18 09:14


 


O2 Sat by Pulse Oximetry (%)      

















Assessment: 





05/01/18 10:20


WITHDRAWAL SX


Plan: 





CONTINUE DETOX

## 2018-05-02 ENCOUNTER — HOSPITAL ENCOUNTER (INPATIENT)
Dept: HOSPITAL 74 - YASAS | Age: 43
LOS: 19 days | Discharge: HOME | DRG: 772 | End: 2018-05-21
Attending: PSYCHIATRY & NEUROLOGY | Admitting: PSYCHIATRY & NEUROLOGY
Payer: COMMERCIAL

## 2018-05-02 VITALS — HEART RATE: 97 BPM | DIASTOLIC BLOOD PRESSURE: 75 MMHG | SYSTOLIC BLOOD PRESSURE: 107 MMHG | TEMPERATURE: 95.6 F

## 2018-05-02 DIAGNOSIS — I10: ICD-10-CM

## 2018-05-02 DIAGNOSIS — F11.20: Primary | ICD-10-CM

## 2018-05-02 DIAGNOSIS — F17.210: ICD-10-CM

## 2018-05-02 DIAGNOSIS — R56.9: ICD-10-CM

## 2018-05-02 DIAGNOSIS — R35.0: ICD-10-CM

## 2018-05-02 DIAGNOSIS — F41.1: ICD-10-CM

## 2018-05-02 DIAGNOSIS — F16.20: ICD-10-CM

## 2018-05-02 DIAGNOSIS — F19.282: ICD-10-CM

## 2018-05-02 DIAGNOSIS — F10.20: ICD-10-CM

## 2018-05-02 DIAGNOSIS — F19.280: ICD-10-CM

## 2018-05-02 PROCEDURE — HZ42ZZZ GROUP COUNSELING FOR SUBSTANCE ABUSE TREATMENT, COGNITIVE-BEHAVIORAL: ICD-10-PCS | Performed by: PSYCHIATRY & NEUROLOGY

## 2018-05-02 RX ADMIN — METOPROLOL TARTRATE SCH MG: 25 TABLET, FILM COATED ORAL at 10:25

## 2018-05-02 RX ADMIN — LEVETIRACETAM SCH MG: 500 TABLET, FILM COATED ORAL at 10:24

## 2018-05-02 RX ADMIN — SUVOREXANT PRN MG: 10 TABLET, FILM COATED ORAL at 21:12

## 2018-05-02 RX ADMIN — LEVETIRACETAM SCH MG: 500 TABLET, FILM COATED ORAL at 21:12

## 2018-05-02 RX ADMIN — TRAZODONE HYDROCHLORIDE SCH MG: 100 TABLET ORAL at 21:12

## 2018-05-02 RX ADMIN — BACITRACIN ZINC SCH GM: 500 OINTMENT TOPICAL at 10:24

## 2018-05-02 RX ADMIN — Medication SCH TAB: at 10:25

## 2018-05-02 RX ADMIN — Medication SCH MG: at 21:12

## 2018-05-02 NOTE — PN
[facility] Progress Note (SOAP)


Subjective: 





DETOX COMPLETED. ALERT O X 3. REFERRED TO [address] FOR 

AFTERCARE.


Objective: 





05/02/18 11:54


 Vital Signs











Temperature  95.6 F L  05/02/18 09:09


 


Pulse Rate  97 H  05/02/18 09:09


 


Respiratory Rate  20   05/02/18 09:09


 


Blood Pressure  107/75   05/02/18 09:09


 


O2 Sat by Pulse Oximetry (%)      








 Laboratory Last Values











WBC  4.6 K/mm3 (4.0-10.0)   04/29/18  08:00    


 


RBC  3.28 M/mm3 (4.00-5.60)  L  04/29/18  08:00    


 


Hgb  10.3 GM/dL (11.7-16.9)  L D 04/29/18  08:00    


 


Hct  30.3 % (35.4-49)  L D 04/29/18  08:00    


 


MCV  92.5 fl (80-96)   04/29/18  08:00    


 


MCH  31.5 pg (25.7-33.7)   04/29/18  08:00    


 


MCHC  34.1 g/dl (32.0-35.9)   04/29/18  08:00    


 


RDW  15.9 % (11.9-15.9)   04/29/18  08:00    


 


Plt Count  227 K/MM3 (134-434)   04/29/18  08:00    


 


MPV  8.0 fl (7.5-11.1)   04/29/18  08:00    


 


Sodium  140 mmol/L (136-145)   04/29/18  08:00    


 


Potassium  3.7 mmol/L (3.5-5.1)   04/29/18  08:00    


 


Chloride  106 mmol/L ()   04/29/18  08:00    


 


Carbon Dioxide  29 mmol/L (21-32)   04/29/18  08:00    


 


Anion Gap  5  (8-16)  L  04/29/18  08:00    


 


BUN  7 mg/dL (7-18)   04/29/18  08:00    


 


Creatinine  0.8 mg/dL (0.7-1.3)   04/29/18  08:00    


 


Creat Clearance w eGFR  > 60  (>60)   04/29/18  08:00    


 


Random Glucose  89 mg/dL ()   04/29/18  08:00    


 


Calcium  8.0 mg/dL (8.5-10.1)  L  04/29/18  08:00    


 


Total Bilirubin  0.5 mg/dL (0.2-1.0)  D 04/29/18  08:00    


 


AST  52 U/L (15-37)  H  04/29/18  08:00    


 


ALT  45 U/L (12-78)   04/29/18  08:00    


 


Alkaline Phosphatase  61 U/L ()   04/29/18  08:00    


 


Total Protein  6.1 g/dl (6.4-8.2)  L  04/29/18  08:00    


 


Albumin  2.8 g/dl (3.4-5.0)  L  04/29/18  08:00    


 


Urine Color  Straw   04/29/18  08:51    


 


Urine Appearance  Clear   04/29/18  08:51    


 


Urine pH  6.0  (5.0-8.0)   04/29/18  08:51    


 


Ur Specific Gravity  1.004  (1.001-1.035)   04/29/18  08:51    


 


Urine Protein  Negative  (NEGATIVE)   04/29/18  08:51    


 


Urine Glucose (UA)  Negative  (NEGATIVE)   04/29/18  08:51    


 


Urine Ketones  Negative  (NEGATIVE)   04/29/18  08:51    


 


Urine Blood  Negative  (NEGATIVE)   04/29/18  08:51    


 


Urine Nitrite  Negative  (NEGATIVE)   04/29/18  08:51    


 


Urine Bilirubin  Negative  (<2.0 mg/dL)   04/29/18  08:51    


 


Urine Urobilinogen  Negative mg/dL (0.2-1.0)   04/29/18  08:51    


 


Ur Leukocyte Esterase  Negative  (NEGATIVE)   04/29/18  08:51    


 


RPR Titer  Nonreactive  (NONREACTIVE)   04/29/18  08:00    











Assessment: 





05/02/18 11:54


MEDICALLY STABLE


Plan: 





D/C PT TODAY
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
## 2018-05-02 NOTE — DS
[facility] Detox Discharge Summary


Admission Date: 


04/28/18





Discharge Date: 05/02/18





- History


Present History: Alcohol Dependence


Additional Comments: 





DETOX COMPLETED. ALERT O X 3. NAD.


Pertinent Past History: 





PLEASE SEE DX BELOW





- Physical Exam Results


Vital Signs: 


 Vital Signs











Temperature  95.6 F L  05/02/18 09:09


 


Pulse Rate  97 H  05/02/18 09:09


 


Respiratory Rate  20   05/02/18 09:09


 


Blood Pressure  107/75   05/02/18 09:09


 


O2 Sat by Pulse Oximetry (%)      











Pertinent Admission Physical Exam Findings: 





WITHDRAWAL SX


 Laboratory Tests











  04/29/18 04/29/18 04/29/18





  08:00 08:00 08:00


 


WBC  4.6  


 


RBC  3.28 L  


 


Hgb  10.3 L D  


 


Hct  30.3 L D  


 


MCV  92.5  


 


MCH  31.5  


 


MCHC  34.1  


 


RDW  15.9  


 


Plt Count  227  


 


MPV  8.0  


 


Sodium   140 


 


Potassium   3.7 


 


Chloride   106 


 


Carbon Dioxide   29 


 


Anion Gap   5 L 


 


BUN   7 


 


Creatinine   0.8 


 


Creat Clearance w eGFR   > 60 


 


Random Glucose   89 


 


Calcium   8.0 L 


 


Total Bilirubin   0.5  D 


 


AST   52 H 


 


ALT   45 


 


Alkaline Phosphatase   61 


 


Total Protein   6.1 L 


 


Albumin   2.8 L 


 


Urine Color   


 


Urine Appearance   


 


Urine pH   


 


Ur Specific Gravity   


 


Urine Protein   


 


Urine Glucose (UA)   


 


Urine Ketones   


 


Urine Blood   


 


Urine Nitrite   


 


Urine Bilirubin   


 


Urine Urobilinogen   


 


Ur Leukocyte Esterase   


 


RPR Titer    Nonreactive














  04/29/18





  08:51


 


WBC 


 


RBC 


 


Hgb 


 


Hct 


 


MCV 


 


MCH 


 


MCHC 


 


RDW 


 


Plt Count 


 


MPV 


 


Sodium 


 


Potassium 


 


Chloride 


 


Carbon Dioxide 


 


Anion Gap 


 


BUN 


 


Creatinine 


 


Creat Clearance w eGFR 


 


Random Glucose 


 


Calcium 


 


Total Bilirubin 


 


AST 


 


ALT 


 


Alkaline Phosphatase 


 


Total Protein 


 


Albumin 


 


Urine Color  Straw


 


Urine Appearance  Clear


 


Urine pH  6.0


 


Ur Specific Gravity  1.004


 


Urine Protein  Negative


 


Urine Glucose (UA)  Negative


 


Urine Ketones  Negative


 


Urine Blood  Negative


 


Urine Nitrite  Negative


 


Urine Bilirubin  Negative


 


Urine Urobilinogen  Negative


 


Ur Leukocyte Esterase  Negative


 


RPR Titer 














- Treatment


Hospital Course: Detox Protocol Followed, Detoxed Safely, Responded well, 

Discharged Condition Good, Rehab Referral Accepted


Patient has Accepted a Rehab Referral to: [facility]





- Medication


Discharge Medications: 


Ambulatory Orders





Clonidine HCl 0.2 mg PO BID 03/21/18 


Zolpidem Tartrate [Ambien] 10 mg PO HS 03/21/18 


Metoprolol Tartrate [Lopressor -] 25 mg PO DAILY #30 tablet 03/23/18 


levETIRAcetam [Keppra -] 500 mg PO BID #60 tablet 03/23/18 


Sertraline HCl [Zoloft] 100 mg PO DAILY 04/28/18 


Buspirone HCl [Buspar -] 15 mg PO TID #90 tablet 04/29/18 











- Diagnosis


(1) Alcohol dependence with uncomplicated withdrawal


Current Visit: Yes   Status: Acute   





(2) Essential (primary) hypertension


Current Visit: Yes   Status: Chronic   





(3) Nicotine dependence


Current Visit: Yes   Status: Acute   


Qualifiers: 


   Nicotine product type: cigarettes   Substance use status: in withdrawal   

Qualified Code(s): F17.213 - Nicotine dependence, cigarettes, with withdrawal   





(4) Chronic pain


Current Visit: Yes   Status: Chronic   





(5) Seizure disorder


Current Visit: Yes   Status: Suspected   





- AMA


Did Patient Leave Against Medical Advice: No

## 2018-05-03 RX ADMIN — NICOTINE SCH MG: 14 PATCH, EXTENDED RELEASE TRANSDERMAL at 17:12

## 2018-05-03 RX ADMIN — Medication SCH MG: at 21:06

## 2018-05-03 RX ADMIN — LIDOCAINE SCH PATCH: 50 PATCH TOPICAL at 17:12

## 2018-05-03 RX ADMIN — Medication SCH: at 21:09

## 2018-05-03 RX ADMIN — TRAZODONE HYDROCHLORIDE SCH MG: 100 TABLET ORAL at 21:06

## 2018-05-03 RX ADMIN — LEVETIRACETAM SCH MG: 500 TABLET, FILM COATED ORAL at 21:07

## 2018-05-03 RX ADMIN — HYDROXYZINE PAMOATE PRN MG: 50 CAPSULE ORAL at 14:17

## 2018-05-03 RX ADMIN — LEVETIRACETAM SCH MG: 500 TABLET, FILM COATED ORAL at 09:40

## 2018-05-03 RX ADMIN — Medication SCH TAB: at 09:40

## 2018-05-03 RX ADMIN — SUVOREXANT PRN MG: 10 TABLET, FILM COATED ORAL at 21:07

## 2018-05-03 RX ADMIN — METOPROLOL TARTRATE SCH MG: 25 TABLET, FILM COATED ORAL at 09:40

## 2018-05-03 NOTE — PN
[facility] Progress Note


Note: 





Patient c/o of generalize pain with muscular tightness on back.





A/P


Aox3, no distress


+ back and muscle pain


Full ROM ambulating in the unit 





Plan: 


Flexeril 5mg TID PRN 


Increase fluids 


Continue to ambulate


Continue to monitor

## 2018-05-04 RX ADMIN — LEVETIRACETAM SCH MG: 500 TABLET, FILM COATED ORAL at 21:13

## 2018-05-04 RX ADMIN — Medication SCH TAB: at 09:43

## 2018-05-04 RX ADMIN — NICOTINE SCH MG: 14 PATCH, EXTENDED RELEASE TRANSDERMAL at 09:43

## 2018-05-04 RX ADMIN — Medication SCH EACH: at 21:15

## 2018-05-04 RX ADMIN — SUVOREXANT PRN MG: 10 TABLET, FILM COATED ORAL at 21:13

## 2018-05-04 RX ADMIN — CYCLOBENZAPRINE HYDROCHLORIDE PRN MG: 5 TABLET, FILM COATED ORAL at 21:13

## 2018-05-04 RX ADMIN — LEVETIRACETAM SCH MG: 500 TABLET, FILM COATED ORAL at 09:43

## 2018-05-04 RX ADMIN — Medication SCH MG: at 21:15

## 2018-05-04 RX ADMIN — TRAZODONE HYDROCHLORIDE SCH MG: 50 TABLET ORAL at 21:12

## 2018-05-04 RX ADMIN — METOPROLOL TARTRATE SCH MG: 25 TABLET, FILM COATED ORAL at 09:43

## 2018-05-04 RX ADMIN — LIDOCAINE SCH PATCH: 50 PATCH TOPICAL at 09:43

## 2018-05-05 RX ADMIN — Medication SCH: at 21:20

## 2018-05-05 RX ADMIN — Medication SCH TAB: at 09:40

## 2018-05-05 RX ADMIN — TRAZODONE HYDROCHLORIDE SCH MG: 50 TABLET ORAL at 21:19

## 2018-05-05 RX ADMIN — LEVETIRACETAM SCH MG: 500 TABLET, FILM COATED ORAL at 09:40

## 2018-05-05 RX ADMIN — LEVETIRACETAM SCH MG: 500 TABLET, FILM COATED ORAL at 21:20

## 2018-05-05 RX ADMIN — LIDOCAINE SCH PATCH: 50 PATCH TOPICAL at 09:40

## 2018-05-05 RX ADMIN — CYCLOBENZAPRINE HYDROCHLORIDE PRN MG: 5 TABLET, FILM COATED ORAL at 21:19

## 2018-05-05 RX ADMIN — NICOTINE SCH MG: 14 PATCH, EXTENDED RELEASE TRANSDERMAL at 09:43

## 2018-05-05 RX ADMIN — METOPROLOL TARTRATE SCH MG: 25 TABLET, FILM COATED ORAL at 09:40

## 2018-05-05 RX ADMIN — SUVOREXANT PRN MG: 10 TABLET, FILM COATED ORAL at 21:20

## 2018-05-05 RX ADMIN — Medication SCH MG: at 21:19

## 2018-05-05 NOTE — PN
[facility] Progress Note


Note: 





Psychiatry


Attending's on call note : 


Renewal of belsomra : 


Requested by nurse.


Chart reviewed.Dose verified.


No adverse effects reported.


Belsomra 10 mg po hs prn.Ordered.

## 2018-05-06 RX ADMIN — NICOTINE SCH MG: 14 PATCH, EXTENDED RELEASE TRANSDERMAL at 09:57

## 2018-05-06 RX ADMIN — TRAZODONE HYDROCHLORIDE SCH MG: 50 TABLET ORAL at 21:03

## 2018-05-06 RX ADMIN — Medication SCH MG: at 21:03

## 2018-05-06 RX ADMIN — METOPROLOL TARTRATE SCH MG: 25 TABLET, FILM COATED ORAL at 09:57

## 2018-05-06 RX ADMIN — LEVETIRACETAM SCH MG: 500 TABLET, FILM COATED ORAL at 21:03

## 2018-05-06 RX ADMIN — Medication SCH TAB: at 09:56

## 2018-05-06 RX ADMIN — SUVOREXANT PRN MG: 10 TABLET, FILM COATED ORAL at 21:05

## 2018-05-06 RX ADMIN — Medication SCH EACH: at 21:21

## 2018-05-06 RX ADMIN — LIDOCAINE SCH PATCH: 50 PATCH TOPICAL at 09:57

## 2018-05-06 RX ADMIN — LEVETIRACETAM SCH MG: 500 TABLET, FILM COATED ORAL at 09:56

## 2018-05-06 RX ADMIN — HYDROXYZINE PAMOATE PRN MG: 50 CAPSULE ORAL at 21:04

## 2018-05-07 RX ADMIN — SUVOREXANT PRN MG: 10 TABLET, FILM COATED ORAL at 21:53

## 2018-05-07 RX ADMIN — NICOTINE POLACRILEX PRN MG: 2 GUM, CHEWING ORAL at 13:22

## 2018-05-07 RX ADMIN — Medication SCH TAB: at 09:29

## 2018-05-07 RX ADMIN — HYDROXYZINE PAMOATE PRN MG: 50 CAPSULE ORAL at 21:55

## 2018-05-07 RX ADMIN — METOPROLOL TARTRATE SCH MG: 25 TABLET, FILM COATED ORAL at 09:29

## 2018-05-07 RX ADMIN — Medication SCH EACH: at 21:56

## 2018-05-07 RX ADMIN — LEVETIRACETAM SCH MG: 500 TABLET, FILM COATED ORAL at 09:29

## 2018-05-07 RX ADMIN — CYCLOBENZAPRINE HYDROCHLORIDE PRN MG: 5 TABLET, FILM COATED ORAL at 21:53

## 2018-05-07 RX ADMIN — NICOTINE POLACRILEX PRN MG: 2 GUM, CHEWING ORAL at 09:32

## 2018-05-07 RX ADMIN — Medication SCH MG: at 21:53

## 2018-05-07 RX ADMIN — LEVETIRACETAM SCH MG: 500 TABLET, FILM COATED ORAL at 21:54

## 2018-05-07 RX ADMIN — LIDOCAINE SCH PATCH: 50 PATCH TOPICAL at 09:29

## 2018-05-07 RX ADMIN — TRAZODONE HYDROCHLORIDE SCH MG: 50 TABLET ORAL at 21:53

## 2018-05-07 RX ADMIN — NICOTINE SCH MG: 14 PATCH, EXTENDED RELEASE TRANSDERMAL at 09:31

## 2018-05-08 RX ADMIN — Medication SCH EACH: at 21:08

## 2018-05-08 RX ADMIN — NICOTINE SCH MG: 14 PATCH, EXTENDED RELEASE TRANSDERMAL at 09:39

## 2018-05-08 RX ADMIN — NICOTINE POLACRILEX PRN MG: 2 GUM, CHEWING ORAL at 13:52

## 2018-05-08 RX ADMIN — LEVETIRACETAM SCH MG: 500 TABLET, FILM COATED ORAL at 21:08

## 2018-05-08 RX ADMIN — METOPROLOL TARTRATE SCH MG: 25 TABLET, FILM COATED ORAL at 09:39

## 2018-05-08 RX ADMIN — NICOTINE POLACRILEX PRN MG: 2 GUM, CHEWING ORAL at 10:25

## 2018-05-08 RX ADMIN — LEVETIRACETAM SCH MG: 500 TABLET, FILM COATED ORAL at 09:39

## 2018-05-08 RX ADMIN — HYDROXYZINE PAMOATE PRN MG: 50 CAPSULE ORAL at 21:10

## 2018-05-08 RX ADMIN — LIDOCAINE SCH PATCH: 50 PATCH TOPICAL at 09:39

## 2018-05-08 RX ADMIN — SUVOREXANT PRN MG: 10 TABLET, FILM COATED ORAL at 21:10

## 2018-05-08 RX ADMIN — HYDROXYZINE PAMOATE PRN MG: 50 CAPSULE ORAL at 14:49

## 2018-05-08 RX ADMIN — Medication SCH MG: at 21:07

## 2018-05-08 RX ADMIN — Medication SCH TAB: at 09:39

## 2018-05-08 RX ADMIN — TRAZODONE HYDROCHLORIDE SCH MG: 50 TABLET ORAL at 21:07

## 2018-05-09 RX ADMIN — HYDROXYZINE PAMOATE PRN MG: 50 CAPSULE ORAL at 21:08

## 2018-05-09 RX ADMIN — LEVETIRACETAM SCH MG: 500 TABLET, FILM COATED ORAL at 21:05

## 2018-05-09 RX ADMIN — HYDROXYZINE PAMOATE PRN MG: 50 CAPSULE ORAL at 15:08

## 2018-05-09 RX ADMIN — LIDOCAINE SCH PATCH: 50 PATCH TOPICAL at 09:48

## 2018-05-09 RX ADMIN — SUVOREXANT PRN MG: 10 TABLET, FILM COATED ORAL at 21:08

## 2018-05-09 RX ADMIN — TRAZODONE HYDROCHLORIDE SCH MG: 50 TABLET ORAL at 21:05

## 2018-05-09 RX ADMIN — METOPROLOL TARTRATE SCH MG: 25 TABLET, FILM COATED ORAL at 09:46

## 2018-05-09 RX ADMIN — Medication SCH TAB: at 09:47

## 2018-05-09 RX ADMIN — Medication SCH: at 21:06

## 2018-05-09 RX ADMIN — Medication SCH MG: at 21:05

## 2018-05-09 RX ADMIN — LEVETIRACETAM SCH MG: 500 TABLET, FILM COATED ORAL at 09:47

## 2018-05-09 RX ADMIN — HYDROXYZINE PAMOATE PRN MG: 50 CAPSULE ORAL at 11:07

## 2018-05-09 RX ADMIN — NICOTINE SCH MG: 14 PATCH, EXTENDED RELEASE TRANSDERMAL at 09:47

## 2018-05-09 RX ADMIN — HYDROXYZINE PAMOATE PRN MG: 50 CAPSULE ORAL at 06:23

## 2018-05-10 RX ADMIN — METOPROLOL TARTRATE SCH MG: 25 TABLET, FILM COATED ORAL at 09:35

## 2018-05-10 RX ADMIN — Medication SCH: at 21:11

## 2018-05-10 RX ADMIN — LEVETIRACETAM SCH MG: 500 TABLET, FILM COATED ORAL at 09:35

## 2018-05-10 RX ADMIN — HYDROXYZINE PAMOATE PRN MG: 50 CAPSULE ORAL at 13:46

## 2018-05-10 RX ADMIN — TRAZODONE HYDROCHLORIDE SCH MG: 50 TABLET ORAL at 21:11

## 2018-05-10 RX ADMIN — HYDROXYZINE PAMOATE PRN MG: 50 CAPSULE ORAL at 21:13

## 2018-05-10 RX ADMIN — LEVETIRACETAM SCH MG: 500 TABLET, FILM COATED ORAL at 21:11

## 2018-05-10 RX ADMIN — SUVOREXANT PRN MG: 10 TABLET, FILM COATED ORAL at 21:13

## 2018-05-10 RX ADMIN — HYDROXYZINE PAMOATE PRN MG: 50 CAPSULE ORAL at 06:43

## 2018-05-10 RX ADMIN — NICOTINE SCH MG: 14 PATCH, EXTENDED RELEASE TRANSDERMAL at 09:34

## 2018-05-10 RX ADMIN — Medication SCH MG: at 21:12

## 2018-05-10 RX ADMIN — Medication SCH TAB: at 09:34

## 2018-05-10 RX ADMIN — LIDOCAINE SCH PATCH: 50 PATCH TOPICAL at 09:35

## 2018-05-11 RX ADMIN — TRAZODONE HYDROCHLORIDE SCH MG: 100 TABLET ORAL at 21:08

## 2018-05-11 RX ADMIN — METOPROLOL TARTRATE SCH MG: 25 TABLET, FILM COATED ORAL at 09:43

## 2018-05-11 RX ADMIN — SUVOREXANT PRN MG: 10 TABLET, FILM COATED ORAL at 21:07

## 2018-05-11 RX ADMIN — Medication SCH: at 21:08

## 2018-05-11 RX ADMIN — Medication SCH MG: at 21:07

## 2018-05-11 RX ADMIN — Medication SCH TAB: at 09:43

## 2018-05-11 RX ADMIN — HYDROXYZINE PAMOATE PRN MG: 50 CAPSULE ORAL at 06:15

## 2018-05-11 RX ADMIN — HYDROXYZINE PAMOATE PRN MG: 50 CAPSULE ORAL at 21:09

## 2018-05-11 RX ADMIN — LIDOCAINE SCH PATCH: 50 PATCH TOPICAL at 09:43

## 2018-05-11 RX ADMIN — NICOTINE SCH MG: 14 PATCH, EXTENDED RELEASE TRANSDERMAL at 09:43

## 2018-05-11 RX ADMIN — LEVETIRACETAM SCH MG: 500 TABLET, FILM COATED ORAL at 21:07

## 2018-05-11 RX ADMIN — LEVETIRACETAM SCH MG: 500 TABLET, FILM COATED ORAL at 09:42

## 2018-05-11 RX ADMIN — HYDROXYZINE PAMOATE PRN MG: 50 CAPSULE ORAL at 13:33

## 2018-05-11 NOTE — PN
Psychiatric Progress Note


Vital Signs: 


 Vital Signs











 Period  Temp  Pulse  Resp  BP Sys/Ayala  Pulse Ox


 


 Last 24 Hr  98.3 F    18-18  106-126/71-76  











Date of Session: 05/11/18


Chief Complaint:: Insomnia


HPI: Patient addressing Alcohol, Opioid and Phencyclidine Dependence comorbid 

with Nicotine Dependence, Substance-Induced Anxiety Disorder and Substance-

Induced Sleep Disorder


ROS: HTN, Seizure


Current Medications: 


Active Medications











Generic Name Dose Route Start Last Admin





  Trade Name Freq  PRN Reason Stop Dose Admin


 


Acetaminophen  650 mg  05/02/18 15:26  





  Tylenol -  PO   





  Q4H PRN   





  FEVER   


 


Al Hydroxide/Mg Hydroxide  30 ml  05/02/18 15:26  





  Mylanta Oral Suspension -  PO   





  Q6H PRN   





  DYSPEPSIA   


 


Buspirone HCl  15 mg  05/02/18 14:00  05/11/18 06:15





  Buspar -  PO   15 mg





  TID RISHABH   Administration


 


Clonidine  0.2 mg  05/02/18 22:00  05/11/18 09:42





  Catapres -  PO   0.2 mg





  BID RISHABH   Administration


 


Cyclobenzaprine HCl  5 mg  05/03/18 15:04  05/07/18 21:53





  Cyclobenzaprine Hcl  PO   5 mg





  TID PRN   Administration





  BACK PAIN   


 


Eucalyptus/Menthol/Phenol/Sorbitol  1 each  05/02/18 15:26  





  Cepastat Lozenge -  MM   





  Q4H PRN   





  SORE THROAT   


 


Guaifenesin  10 ml  05/02/18 15:26  





  Robitussin Dm -  PO   





  Q6H PRN   





  COUGH   


 


Hydroxyzine Pamoate  50 mg  05/02/18 15:26  05/11/18 06:15





  Vistaril -  PO   50 mg





  Q4H PRN   Administration





  AGITATION   


 


Levetiracetam  500 mg  05/02/18 22:00  05/11/18 09:42





  Keppra -  PO   500 mg





  BID RISHABH   Administration


 


Lidocaine  1 patch  05/03/18 15:30  05/11/18 09:43





  Lidoderm Patch -  TP   1 patch





  DAILY RISHABH   Administration


 


Loperamide HCl  4 mg  05/02/18 15:26  





  Imodium -  PO   





  Q6H PRN   





  DIARRHEA   


 


Magnesium Citrate  300 ml  05/02/18 15:26  





  Citroma -  PO   





  Q48H PRN   





  CONSTIPATION   


 


Magnesium Hydroxide  30 ml  05/02/18 15:26  





  Milk Of Magnesia -  PO   





  DAILY PRN   





  CONSTIPATION   


 


Metoprolol Tartrate  25 mg  05/03/18 10:00  05/11/18 09:43





  Lopressor -  PO   25 mg





  DAILY RISHABH   Administration


 


Miscellaneous  1 each  05/03/18 22:00  05/10/18 21:11





  Lidoderm Patch Removal  MC   Not Given





  DAILY@2200 RISHABH   


 


Nicotine  14 mg  05/03/18 15:30  05/11/18 09:43





  Nicoderm Patch -  TD   14 mg





  DAILY RISHABH   Administration


 


Nicotine Polacrilex  2 mg  05/03/18 14:41  05/08/18 13:52





  Nicorette Gum -  BUC   2 mg





  Q2H PRN   Administration





  NICOTINE REPLACEMENT RX   


 


Prenatal Multivit/Folic Acid/Iron  1 tab  05/03/18 10:00  05/11/18 09:43





  Prenatal Vitamins (Sjr) -  PO   1 tab





  DAILY RISHABH   Administration


 


Pseudoephedrine/Triprolidine  1 combo  05/02/18 15:26  





  Actifed -  PO   





  TID PRN   





  NASAL CONGESTION   


 


Suvorexant  10 mg  05/08/18 22:00  05/10/18 21:13





  Belsomra  PO   10 mg





  HS PRN   Administration





  INSOMNIA   


 


Suvorexant  10 mg  05/11/18 22:00  





  Belsomra  PO   





  HS PRN   





  INSOMNIA   


 


Suvorexant  15 mg  05/11/18 22:00  





  Belsomra  PO   





  HS PRN   





  INSOMNIA   


 


Thiamine HCl  100 mg  05/02/18 22:00  05/10/18 21:12





  Vitamin B1 -  PO   100 mg





  HS RISHABH   Administration


 


Trazodone HCl  200 mg  05/11/18 22:00  





  Desyrel -  PO   





  HS RISHABH   











Current Side Effect: No


Lab tests ordered: Yes


Lab tests reviewed: Yes


Provider note:: Reports experiencing difficulty to sleep despite taking 

Trazadone 150 mg, Belsomra and Vistaril 50 mg at bedtime. Requests to be 

ordered Ambien. Told by writer that Ambien is not allowed for use in rehab in 

this facility. Discussed instead increasing dosage of both Trazadone and 

Belsomra. He agreed with plan


Total face to face time:: 15





Mental Status Exam





- Mental Status Exam


Alert and Oriented to: Time, Place, Person


Cognitive Function: Fair


Patient Appearance: Well Groomed


Mood: Anxious


Affect: Appropriate


Patient Behavior: Cooperative


Speech Pattern: Clear


Voice Loudness: Normal


Thought Process: Intact, Goal Oriented


Thought Disorder: Not Present


Hallucinations: Denies


Suicidal Ideation: Denies


Homicidal Ideation: Denies


Insight/Judgement: Fair


Sleep: Poorly


Appetite: Good


Muscle strength/Tone: Normal


Gait/Station: Normal





Psychiatric Treatment Plan





- Problem List


(1) Substance-induced anxiety disorder


Current Visit: No   





(2) JOVITA (generalized anxiety disorder)


Current Visit: No   





(3) Substance-induced sleep disorder


Current Visit: No   





(4) Alcohol dependence


Current Visit: No   





(5) Opioid dependence


Current Visit: No   





(6) Phencyclidine dependence


Current Visit: No   





(7) Nicotine dependence


Current Visit: No   


Qualifiers: 


   Nicotine product type: cigarettes   Substance use status: in withdrawal   

Qualified Code(s): F17.213 - Nicotine dependence, cigarettes, with withdrawal   





(8) Seizures


Current Visit: No   


Qualifiers: 


   Convulsion type: unspecified   Qualified Code(s): R56.9 - Unspecified 

convulsions   





(9) Essential (primary) hypertension


Current Visit: No   


Initial treatment plan: 1) Discontinue Trazadone and Belsomra as currently 

ordered.  2) Start Trazadone 200 mg po HS and Belsomra 15 mg po HS prn for 

insomnia.  3) Monitor progress

## 2018-05-11 NOTE — PN
[facility] Progress Note


Note: 





Patient presents with urinary frequency and nocturia. Denies burning upon 

urination and fever. 


 Vital Signs











Temperature  98.3 F   05/11/18 07:16


 


Pulse Rate  101 H  05/11/18 09:30


 


Respiratory Rate  18   05/11/18 07:16


 


Blood Pressure  106/71   05/11/18 09:30


 


O2 Sat by Pulse Oximetry (%)      








Obj:





General: Pt is awake and oriented x 3. In no acute distress.





Skin: Warm and dry


  


GI: soft, BS+, NT





: no pelvic tenderness, no CVAT





A/P:





Urinary frequency





Will start Flomax 0.4mg hs


continue to monitor clinically

## 2018-05-12 RX ADMIN — METOPROLOL TARTRATE SCH MG: 25 TABLET, FILM COATED ORAL at 09:59

## 2018-05-12 RX ADMIN — LEVETIRACETAM SCH MG: 500 TABLET, FILM COATED ORAL at 21:58

## 2018-05-12 RX ADMIN — SUVOREXANT PRN MG: 15 TABLET, FILM COATED ORAL at 22:00

## 2018-05-12 RX ADMIN — HYDROXYZINE PAMOATE PRN MG: 50 CAPSULE ORAL at 09:59

## 2018-05-12 RX ADMIN — LIDOCAINE SCH PATCH: 50 PATCH TOPICAL at 10:00

## 2018-05-12 RX ADMIN — TRAZODONE HYDROCHLORIDE SCH MG: 100 TABLET ORAL at 21:58

## 2018-05-12 RX ADMIN — Medication SCH MG: at 21:57

## 2018-05-12 RX ADMIN — HYDROXYZINE PAMOATE PRN MG: 50 CAPSULE ORAL at 21:59

## 2018-05-12 RX ADMIN — Medication SCH: at 21:58

## 2018-05-12 RX ADMIN — HYDROXYZINE PAMOATE PRN MG: 50 CAPSULE ORAL at 06:04

## 2018-05-12 RX ADMIN — HYDROXYZINE PAMOATE PRN MG: 50 CAPSULE ORAL at 14:25

## 2018-05-12 RX ADMIN — Medication SCH TAB: at 09:59

## 2018-05-12 RX ADMIN — TAMSULOSIN HYDROCHLORIDE SCH MG: 0.4 CAPSULE ORAL at 07:29

## 2018-05-12 RX ADMIN — NICOTINE SCH: 14 PATCH, EXTENDED RELEASE TRANSDERMAL at 09:59

## 2018-05-12 RX ADMIN — LEVETIRACETAM SCH MG: 500 TABLET, FILM COATED ORAL at 09:59

## 2018-05-13 RX ADMIN — HYDROXYZINE PAMOATE PRN MG: 50 CAPSULE ORAL at 13:24

## 2018-05-13 RX ADMIN — HYDROXYZINE PAMOATE PRN MG: 50 CAPSULE ORAL at 21:03

## 2018-05-13 RX ADMIN — Medication SCH MG: at 21:03

## 2018-05-13 RX ADMIN — LEVETIRACETAM SCH MG: 500 TABLET, FILM COATED ORAL at 21:04

## 2018-05-13 RX ADMIN — LEVETIRACETAM SCH MG: 500 TABLET, FILM COATED ORAL at 09:45

## 2018-05-13 RX ADMIN — SUVOREXANT PRN MG: 15 TABLET, FILM COATED ORAL at 21:03

## 2018-05-13 RX ADMIN — TRAZODONE HYDROCHLORIDE SCH MG: 100 TABLET ORAL at 21:04

## 2018-05-13 RX ADMIN — TAMSULOSIN HYDROCHLORIDE SCH MG: 0.4 CAPSULE ORAL at 07:34

## 2018-05-13 RX ADMIN — Medication SCH: at 21:04

## 2018-05-13 RX ADMIN — LIDOCAINE SCH PATCH: 50 PATCH TOPICAL at 09:46

## 2018-05-13 RX ADMIN — Medication SCH TAB: at 09:46

## 2018-05-13 RX ADMIN — NICOTINE SCH: 14 PATCH, EXTENDED RELEASE TRANSDERMAL at 09:46

## 2018-05-13 RX ADMIN — METOPROLOL TARTRATE SCH MG: 25 TABLET, FILM COATED ORAL at 09:45

## 2018-05-13 RX ADMIN — HYDROXYZINE PAMOATE PRN MG: 50 CAPSULE ORAL at 06:26

## 2018-05-14 RX ADMIN — TAMSULOSIN HYDROCHLORIDE SCH MG: 0.4 CAPSULE ORAL at 08:02

## 2018-05-14 RX ADMIN — Medication SCH TAB: at 09:38

## 2018-05-14 RX ADMIN — Medication SCH MG: at 21:03

## 2018-05-14 RX ADMIN — HYDROXYZINE PAMOATE PRN MG: 50 CAPSULE ORAL at 13:48

## 2018-05-14 RX ADMIN — Medication SCH EACH: at 21:03

## 2018-05-14 RX ADMIN — METOPROLOL TARTRATE SCH MG: 25 TABLET, FILM COATED ORAL at 09:38

## 2018-05-14 RX ADMIN — HYDROXYZINE PAMOATE PRN MG: 50 CAPSULE ORAL at 06:24

## 2018-05-14 RX ADMIN — SUVOREXANT PRN MG: 15 TABLET, FILM COATED ORAL at 21:01

## 2018-05-14 RX ADMIN — LIDOCAINE SCH PATCH: 50 PATCH TOPICAL at 09:38

## 2018-05-14 RX ADMIN — HYDROXYZINE PAMOATE PRN MG: 50 CAPSULE ORAL at 09:38

## 2018-05-14 RX ADMIN — TRAZODONE HYDROCHLORIDE SCH MG: 100 TABLET ORAL at 21:00

## 2018-05-14 RX ADMIN — NICOTINE SCH: 14 PATCH, EXTENDED RELEASE TRANSDERMAL at 09:38

## 2018-05-14 RX ADMIN — LEVETIRACETAM SCH MG: 500 TABLET, FILM COATED ORAL at 21:01

## 2018-05-14 RX ADMIN — LEVETIRACETAM SCH MG: 500 TABLET, FILM COATED ORAL at 09:38

## 2018-05-15 RX ADMIN — Medication SCH EACH: at 21:32

## 2018-05-15 RX ADMIN — Medication SCH TAB: at 09:43

## 2018-05-15 RX ADMIN — TRAZODONE HYDROCHLORIDE SCH MG: 100 TABLET ORAL at 21:32

## 2018-05-15 RX ADMIN — LEVETIRACETAM SCH MG: 500 TABLET, FILM COATED ORAL at 09:43

## 2018-05-15 RX ADMIN — HYDROXYZINE PAMOATE PRN MG: 50 CAPSULE ORAL at 13:30

## 2018-05-15 RX ADMIN — LEVETIRACETAM SCH MG: 500 TABLET, FILM COATED ORAL at 21:32

## 2018-05-15 RX ADMIN — HYDROXYZINE PAMOATE PRN MG: 50 CAPSULE ORAL at 05:55

## 2018-05-15 RX ADMIN — Medication SCH MG: at 21:32

## 2018-05-15 RX ADMIN — NICOTINE SCH: 14 PATCH, EXTENDED RELEASE TRANSDERMAL at 09:43

## 2018-05-15 RX ADMIN — TAMSULOSIN HYDROCHLORIDE SCH MG: 0.4 CAPSULE ORAL at 08:06

## 2018-05-15 RX ADMIN — HYDROXYZINE PAMOATE PRN MG: 50 CAPSULE ORAL at 21:32

## 2018-05-15 RX ADMIN — LIDOCAINE SCH PATCH: 50 PATCH TOPICAL at 09:42

## 2018-05-15 RX ADMIN — METOPROLOL TARTRATE SCH MG: 25 TABLET, FILM COATED ORAL at 09:43

## 2018-05-15 RX ADMIN — SUVOREXANT PRN MG: 15 TABLET, FILM COATED ORAL at 21:31

## 2018-05-16 RX ADMIN — Medication SCH TAB: at 09:49

## 2018-05-16 RX ADMIN — NICOTINE SCH: 14 PATCH, EXTENDED RELEASE TRANSDERMAL at 09:49

## 2018-05-16 RX ADMIN — LIDOCAINE SCH PATCH: 50 PATCH TOPICAL at 09:49

## 2018-05-16 RX ADMIN — LEVETIRACETAM SCH MG: 500 TABLET, FILM COATED ORAL at 09:49

## 2018-05-16 RX ADMIN — TAMSULOSIN HYDROCHLORIDE SCH MG: 0.4 CAPSULE ORAL at 08:49

## 2018-05-16 RX ADMIN — HYDROXYZINE PAMOATE PRN MG: 50 CAPSULE ORAL at 13:27

## 2018-05-16 RX ADMIN — Medication SCH: at 21:10

## 2018-05-16 RX ADMIN — LEVETIRACETAM SCH MG: 500 TABLET, FILM COATED ORAL at 21:08

## 2018-05-16 RX ADMIN — SUVOREXANT PRN MG: 15 TABLET, FILM COATED ORAL at 21:09

## 2018-05-16 RX ADMIN — HYDROXYZINE PAMOATE PRN MG: 50 CAPSULE ORAL at 21:08

## 2018-05-16 RX ADMIN — TRAZODONE HYDROCHLORIDE SCH MG: 100 TABLET ORAL at 21:10

## 2018-05-16 RX ADMIN — Medication SCH MG: at 21:08

## 2018-05-16 RX ADMIN — HYDROXYZINE PAMOATE PRN MG: 50 CAPSULE ORAL at 06:00

## 2018-05-16 RX ADMIN — METOPROLOL TARTRATE SCH MG: 25 TABLET, FILM COATED ORAL at 10:26

## 2018-05-17 RX ADMIN — TAMSULOSIN HYDROCHLORIDE SCH MG: 0.4 CAPSULE ORAL at 07:36

## 2018-05-17 RX ADMIN — Medication SCH TAB: at 09:31

## 2018-05-17 RX ADMIN — TRAZODONE HYDROCHLORIDE SCH MG: 100 TABLET ORAL at 21:04

## 2018-05-17 RX ADMIN — Medication SCH MG: at 21:04

## 2018-05-17 RX ADMIN — HYDROXYZINE PAMOATE PRN MG: 50 CAPSULE ORAL at 06:01

## 2018-05-17 RX ADMIN — NICOTINE SCH: 14 PATCH, EXTENDED RELEASE TRANSDERMAL at 09:32

## 2018-05-17 RX ADMIN — HYDROXYZINE PAMOATE PRN MG: 50 CAPSULE ORAL at 14:02

## 2018-05-17 RX ADMIN — LIDOCAINE SCH PATCH: 50 PATCH TOPICAL at 09:32

## 2018-05-17 RX ADMIN — HYDROXYZINE PAMOATE PRN MG: 50 CAPSULE ORAL at 21:06

## 2018-05-17 RX ADMIN — METOPROLOL TARTRATE SCH MG: 25 TABLET, FILM COATED ORAL at 09:31

## 2018-05-17 RX ADMIN — SUVOREXANT PRN MG: 15 TABLET, FILM COATED ORAL at 21:06

## 2018-05-17 RX ADMIN — Medication SCH EACH: at 21:04

## 2018-05-17 RX ADMIN — LEVETIRACETAM SCH MG: 500 TABLET, FILM COATED ORAL at 21:04

## 2018-05-17 RX ADMIN — LEVETIRACETAM SCH MG: 500 TABLET, FILM COATED ORAL at 09:31

## 2018-05-17 RX ADMIN — HYDROXYZINE PAMOATE PRN MG: 50 CAPSULE ORAL at 09:31

## 2018-05-18 RX ADMIN — METOPROLOL TARTRATE SCH MG: 25 TABLET, FILM COATED ORAL at 09:33

## 2018-05-18 RX ADMIN — TAMSULOSIN HYDROCHLORIDE SCH MG: 0.4 CAPSULE ORAL at 08:00

## 2018-05-18 RX ADMIN — TRAZODONE HYDROCHLORIDE SCH MG: 100 TABLET ORAL at 21:25

## 2018-05-18 RX ADMIN — NICOTINE SCH: 14 PATCH, EXTENDED RELEASE TRANSDERMAL at 09:34

## 2018-05-18 RX ADMIN — Medication SCH EACH: at 21:25

## 2018-05-18 RX ADMIN — LEVETIRACETAM SCH MG: 500 TABLET, FILM COATED ORAL at 09:33

## 2018-05-18 RX ADMIN — HYDROXYZINE PAMOATE PRN MG: 50 CAPSULE ORAL at 14:56

## 2018-05-18 RX ADMIN — HYDROXYZINE PAMOATE PRN MG: 50 CAPSULE ORAL at 06:00

## 2018-05-18 RX ADMIN — LEVETIRACETAM SCH MG: 500 TABLET, FILM COATED ORAL at 21:25

## 2018-05-18 RX ADMIN — Medication SCH MG: at 21:25

## 2018-05-18 RX ADMIN — Medication SCH TAB: at 09:33

## 2018-05-18 RX ADMIN — LIDOCAINE SCH PATCH: 50 PATCH TOPICAL at 09:33

## 2018-05-19 RX ADMIN — HYDROXYZINE PAMOATE PRN MG: 50 CAPSULE ORAL at 06:20

## 2018-05-19 RX ADMIN — HYDROXYZINE PAMOATE PRN MG: 50 CAPSULE ORAL at 13:34

## 2018-05-19 RX ADMIN — HYDROXYZINE PAMOATE PRN MG: 50 CAPSULE ORAL at 21:11

## 2018-05-19 RX ADMIN — METOPROLOL TARTRATE SCH MG: 25 TABLET, FILM COATED ORAL at 09:28

## 2018-05-19 RX ADMIN — TAMSULOSIN HYDROCHLORIDE SCH MG: 0.4 CAPSULE ORAL at 07:50

## 2018-05-19 RX ADMIN — Medication SCH MG: at 21:12

## 2018-05-19 RX ADMIN — TRAZODONE HYDROCHLORIDE SCH MG: 100 TABLET ORAL at 21:08

## 2018-05-19 RX ADMIN — LEVETIRACETAM SCH MG: 500 TABLET, FILM COATED ORAL at 09:28

## 2018-05-19 RX ADMIN — NICOTINE SCH: 14 PATCH, EXTENDED RELEASE TRANSDERMAL at 09:28

## 2018-05-19 RX ADMIN — LIDOCAINE SCH PATCH: 50 PATCH TOPICAL at 09:27

## 2018-05-19 RX ADMIN — LEVETIRACETAM SCH MG: 500 TABLET, FILM COATED ORAL at 21:06

## 2018-05-19 RX ADMIN — Medication SCH TAB: at 09:28

## 2018-05-20 RX ADMIN — LEVETIRACETAM SCH MG: 500 TABLET, FILM COATED ORAL at 09:33

## 2018-05-20 RX ADMIN — Medication SCH EACH: at 00:18

## 2018-05-20 RX ADMIN — NICOTINE SCH: 14 PATCH, EXTENDED RELEASE TRANSDERMAL at 09:33

## 2018-05-20 RX ADMIN — Medication SCH MG: at 21:14

## 2018-05-20 RX ADMIN — HYDROXYZINE PAMOATE PRN MG: 50 CAPSULE ORAL at 21:15

## 2018-05-20 RX ADMIN — METOPROLOL TARTRATE SCH MG: 25 TABLET, FILM COATED ORAL at 09:33

## 2018-05-20 RX ADMIN — Medication SCH EACH: at 23:02

## 2018-05-20 RX ADMIN — HYDROXYZINE PAMOATE PRN MG: 50 CAPSULE ORAL at 06:34

## 2018-05-20 RX ADMIN — LIDOCAINE SCH PATCH: 50 PATCH TOPICAL at 09:33

## 2018-05-20 RX ADMIN — Medication SCH TAB: at 09:33

## 2018-05-20 RX ADMIN — LEVETIRACETAM SCH MG: 500 TABLET, FILM COATED ORAL at 21:14

## 2018-05-20 RX ADMIN — TRAZODONE HYDROCHLORIDE SCH MG: 100 TABLET ORAL at 21:14

## 2018-05-20 RX ADMIN — HYDROXYZINE PAMOATE PRN MG: 50 CAPSULE ORAL at 14:20

## 2018-05-20 RX ADMIN — TAMSULOSIN HYDROCHLORIDE SCH MG: 0.4 CAPSULE ORAL at 07:37

## 2018-05-20 NOTE — PN
Psychiatric Progress Note


Vital Signs: 


 Vital Signs











 Period  Temp  Pulse  Resp  BP Sys/Ayala  Pulse Ox


 


 Last 24 Hr  98.1 F  84-94  18-18  101-116/57-71  











Date of Session: 05/20/18


Chief Complaint:: Discharge Note


HPI: Patient addressing Alcohol, Opioid and Phencyclidine Dependence comorbid 

with Nicotine Dependence, JOVITA, Substance-Induced Anxiety Disorder and Substance-

Induced Sleep Disorder


Current Medications: 


Active Medications











Generic Name Dose Route Start Last Admin





  Trade Name Freq  PRN Reason Stop Dose Admin


 


Acetaminophen  650 mg  05/02/18 15:26  





  Tylenol -  PO   





  Q4H PRN   





  FEVER   


 


Al Hydroxide/Mg Hydroxide  30 ml  05/02/18 15:26  





  Mylanta Oral Suspension -  PO   





  Q6H PRN   





  DYSPEPSIA   


 


Buspirone HCl  15 mg  05/02/18 14:00  05/20/18 06:34





  Buspar -  PO   15 mg





  TID RISHABH   Administration


 


Clonidine  0.2 mg  05/02/18 22:00  05/20/18 09:33





  Catapres -  PO   0.2 mg





  BID RISHABH   Administration


 


Cyclobenzaprine HCl  5 mg  05/03/18 15:04  05/07/18 21:53





  Cyclobenzaprine Hcl  PO   5 mg





  TID PRN   Administration





  BACK PAIN   


 


Eucalyptus/Menthol/Phenol/Sorbitol  1 each  05/02/18 15:26  





  Cepastat Lozenge -  MM   





  Q4H PRN   





  SORE THROAT   


 


Guaifenesin  10 ml  05/02/18 15:26  





  Robitussin Dm -  PO   





  Q6H PRN   





  COUGH   


 


Hydroxyzine Pamoate  50 mg  05/02/18 15:26  05/20/18 06:34





  Vistaril -  PO   50 mg





  Q4H PRN   Administration





  AGITATION   


 


Levetiracetam  500 mg  05/02/18 22:00  05/20/18 09:33





  Keppra -  PO   500 mg





  BID RISHABH   Administration


 


Lidocaine  1 patch  05/03/18 15:30  05/20/18 09:33





  Lidoderm Patch -  TP   1 patch





  DAILY RISHABH   Administration


 


Loperamide HCl  4 mg  05/02/18 15:26  





  Imodium -  PO   





  Q6H PRN   





  DIARRHEA   


 


Magnesium Citrate  300 ml  05/02/18 15:26  





  Citroma -  PO   





  Q48H PRN   





  CONSTIPATION   


 


Magnesium Hydroxide  30 ml  05/02/18 15:26  





  Milk Of Magnesia -  PO   





  DAILY PRN   





  CONSTIPATION   


 


Metoprolol Tartrate  25 mg  05/03/18 10:00  05/20/18 09:33





  Lopressor -  PO   25 mg





  DAILY RISHABH   Administration


 


Miscellaneous  1 each  05/03/18 22:00  05/20/18 00:18





  Lidoderm Patch Removal  MC   1 each





  DAILY@2200 RISHABH   Administration


 


Nicotine  14 mg  05/03/18 15:30  05/20/18 09:33





  Nicoderm Patch -  TD   Not Given





  DAILY RISHABH   


 


Nicotine Polacrilex  2 mg  05/03/18 14:41  05/08/18 13:52





  Nicorette Gum -  BUC   2 mg





  Q2H PRN   Administration





  NICOTINE REPLACEMENT RX   


 


Prenatal Multivit/Folic Acid/Iron  1 tab  05/03/18 10:00  05/20/18 09:33





  Prenatal Vitamins ([facility]) -  PO   1 tab





  DAILY RISHABH   Administration


 


Pseudoephedrine/Triprolidine  1 combo  05/02/18 15:26  





  Actifed -  PO   





  TID PRN   





  NASAL CONGESTION   


 


Suvorexant  15 mg  05/18/18 22:00  05/18/18 23:36





  Belsomra  PO  05/21/18 21:59  15 mg





  HS PRN   Administration





  INSOMNIA   


 


Tamsulosin HCl  0.4 mg  05/12/18 08:30  05/20/18 07:37





  Flomax -  PO   0.4 mg





  DAILY@0830 RISHABH   Administration


 


Thiamine HCl  100 mg  05/02/18 22:00  05/19/18 21:12





  Vitamin B1 -  PO   100 mg





  HS RISHABH   Administration


 


Trazodone HCl  200 mg  05/11/18 22:00  05/19/18 21:08





  Desyrel -  PO   200 mg





  HS RISHABH   Administration











Current Side Effect: No


Lab tests ordered: Yes


Lab tests reviewed: Yes


Provider note:: Patient will complete this program on 5/21/18. He has met his 

treatment goals and will continue to address his issues in long term 

residential treatment at [facility] Told writer that 

seeing much older people than he in this program fuels his motivation to 

maintain abstinence since he would not want seeing himself in program like this 

at that age. He responded well to Buspar 15 mg po TID and Trazadone 200 mg po 

HS. Scripts for 30 days supply of these medications will be electronically 

transmitted to [facility] at [address] [place] [address]. He is stable for discharge on 5/21/18


Total face to face time:: 35





Mental Status Exam





- Mental Status Exam


Alert and Oriented to: Time, Place, Person


Cognitive Function: Fair


Patient Appearance: Well Groomed


Mood: Hopeful, Euthymic


Affect: Appropriate


Patient Behavior: Cooperative


Speech Pattern: Clear


Voice Loudness: Normal


Thought Process: Intact


Thought Disorder: Not Present


Hallucinations: Denies


Suicidal Ideation: Denies


Homicidal Ideation: Denies


Insight/Judgement: Fair


Sleep: Fair


Appetite: Good


Muscle strength/Tone: Normal


Gait/Station: Normal





Psychiatric Treatment Plan





- Problem List


(1) Substance-induced anxiety disorder


Current Visit: No   





(2) JOVITA (generalized anxiety disorder)


Current Visit: No   





(3) Substance-induced sleep disorder


Current Visit: No   





(4) Alcohol dependence


Current Visit: No   





(5) Opioid dependence


Current Visit: No   





(6) Phencyclidine dependence


Current Visit: No   





(7) Nicotine dependence


Current Visit: No   


Qualifiers: 


   Nicotine product type: cigarettes   Substance use status: in withdrawal   

Qualified Code(s): F17.213 - Nicotine dependence, cigarettes, with withdrawal   





(8) Seizures


Current Visit: No   


Qualifiers: 


   Convulsion type: unspecified   Qualified Code(s): R56.9 - Unspecified 

convulsions   





(9) Essential (primary) hypertension


Current Visit: No   


Initial treatment plan: Patient will be discharged tomorrow and referred to [facility] 

for long term residential treatment

## 2018-05-21 VITALS — DIASTOLIC BLOOD PRESSURE: 79 MMHG | HEART RATE: 92 BPM | TEMPERATURE: 98 F | SYSTOLIC BLOOD PRESSURE: 115 MMHG

## 2018-05-21 RX ADMIN — HYDROXYZINE PAMOATE PRN MG: 50 CAPSULE ORAL at 06:33
